# Patient Record
Sex: FEMALE | Race: WHITE | NOT HISPANIC OR LATINO | Employment: OTHER | URBAN - METROPOLITAN AREA
[De-identification: names, ages, dates, MRNs, and addresses within clinical notes are randomized per-mention and may not be internally consistent; named-entity substitution may affect disease eponyms.]

---

## 2017-01-06 ENCOUNTER — ALLIED HEALTH/NURSE VISIT (OUTPATIENT)
Dept: FAMILY MEDICINE | Facility: CLINIC | Age: 57
End: 2017-01-06
Payer: COMMERCIAL

## 2017-01-06 VITALS — DIASTOLIC BLOOD PRESSURE: 72 MMHG | SYSTOLIC BLOOD PRESSURE: 116 MMHG | HEART RATE: 76 BPM

## 2017-01-06 DIAGNOSIS — I10 ESSENTIAL HYPERTENSION WITH GOAL BLOOD PRESSURE LESS THAN 140/90: ICD-10-CM

## 2017-01-06 PROCEDURE — 99207 ZZC NO CHARGE NURSE ONLY: CPT | Performed by: FAMILY MEDICINE

## 2017-01-06 NOTE — PROGRESS NOTES
Usha Green is enrolled/participating in the retail pharmacy Blood Pressure Goals Achievement Program (BPGAP).  Usha Green was evaluated at [facility] on January 6, 2017 at which time her blood pressure was:    BP Readings from Last 3 Encounters:   01/06/17 116/72   12/23/16 116/75   09/23/16 109/66     Reviewed lifestyle modifications for blood pressure control and reduction: including making healthy food choices, managing weight, getting regular exercise, smoking cessation, reducing alcohol consumption, monitoring blood pressure regularly.     Usha Green is not experiencing symptoms.    Follow-Up: BP is at goal of < 140/90mmHg (patient 18+ years of age with or without diabetes).  Recommended follow-up at pharmacy in 6 months.     Completed by: Yary Posada PharmD  [facility]

## 2017-02-03 DIAGNOSIS — E78.5 HYPERLIPIDEMIA LDL GOAL <130: ICD-10-CM

## 2017-02-03 LAB
CHOLEST SERPL-MCNC: 287 MG/DL
HDLC SERPL-MCNC: 44 MG/DL
LDLC SERPL CALC-MCNC: 228 MG/DL
NONHDLC SERPL-MCNC: 243 MG/DL
TRIGL SERPL-MCNC: 77 MG/DL

## 2017-02-03 PROCEDURE — 36415 COLL VENOUS BLD VENIPUNCTURE: CPT | Performed by: FAMILY MEDICINE

## 2017-02-03 PROCEDURE — 80061 LIPID PANEL: CPT | Performed by: FAMILY MEDICINE

## 2018-02-25 ENCOUNTER — HEALTH MAINTENANCE LETTER (OUTPATIENT)
Age: 58
End: 2018-02-25

## 2019-09-20 ENCOUNTER — TRANSFERRED RECORDS (OUTPATIENT)
Dept: HEALTH INFORMATION MANAGEMENT | Facility: CLINIC | Age: 59
End: 2019-09-20

## 2020-02-23 ENCOUNTER — HEALTH MAINTENANCE LETTER (OUTPATIENT)
Age: 60
End: 2020-02-23

## 2020-08-25 ENCOUNTER — TRANSFERRED RECORDS (OUTPATIENT)
Dept: HEALTH INFORMATION MANAGEMENT | Facility: CLINIC | Age: 60
End: 2020-08-25

## 2020-09-10 ENCOUNTER — TRANSFERRED RECORDS (OUTPATIENT)
Dept: HEALTH INFORMATION MANAGEMENT | Facility: CLINIC | Age: 60
End: 2020-09-10

## 2020-10-27 ENCOUNTER — TRANSFERRED RECORDS (OUTPATIENT)
Dept: HEALTH INFORMATION MANAGEMENT | Facility: CLINIC | Age: 60
End: 2020-10-27

## 2020-12-06 ENCOUNTER — HEALTH MAINTENANCE LETTER (OUTPATIENT)
Age: 60
End: 2020-12-06

## 2021-04-11 ENCOUNTER — HEALTH MAINTENANCE LETTER (OUTPATIENT)
Age: 61
End: 2021-04-11

## 2021-04-12 ENCOUNTER — IMMUNIZATION (OUTPATIENT)
Dept: NURSING | Facility: CLINIC | Age: 61
End: 2021-04-12
Payer: COMMERCIAL

## 2021-04-12 PROCEDURE — 91300 PR COVID VAC PFIZER DIL RECON 30 MCG/0.3 ML IM: CPT

## 2021-04-12 PROCEDURE — 0001A PR COVID VAC PFIZER DIL RECON 30 MCG/0.3 ML IM: CPT

## 2021-05-03 ENCOUNTER — IMMUNIZATION (OUTPATIENT)
Dept: NURSING | Facility: CLINIC | Age: 61
End: 2021-05-03
Attending: INTERNAL MEDICINE
Payer: COMMERCIAL

## 2021-05-03 PROCEDURE — 0002A PR COVID VAC PFIZER DIL RECON 30 MCG/0.3 ML IM: CPT

## 2021-05-03 PROCEDURE — 91300 PR COVID VAC PFIZER DIL RECON 30 MCG/0.3 ML IM: CPT

## 2021-06-08 ENCOUNTER — OFFICE VISIT - HEALTHEAST (OUTPATIENT)
Dept: FAMILY MEDICINE | Facility: CLINIC | Age: 61
End: 2021-06-08

## 2021-06-08 DIAGNOSIS — M70.62 TROCHANTERIC BURSITIS OF LEFT HIP: ICD-10-CM

## 2021-06-08 DIAGNOSIS — I10 ESSENTIAL HYPERTENSION WITH GOAL BLOOD PRESSURE LESS THAN 140/90: ICD-10-CM

## 2021-06-08 DIAGNOSIS — Z00.00 ROUTINE GENERAL MEDICAL EXAMINATION AT A HEALTH CARE FACILITY: ICD-10-CM

## 2021-06-08 DIAGNOSIS — Z12.4 CERVICAL CANCER SCREENING: ICD-10-CM

## 2021-06-08 DIAGNOSIS — Z13.1 DIABETES MELLITUS SCREENING: ICD-10-CM

## 2021-06-08 DIAGNOSIS — Z12.31 VISIT FOR SCREENING MAMMOGRAM: ICD-10-CM

## 2021-06-08 DIAGNOSIS — E66.3 OVERWEIGHT (BMI 25.0-29.9): ICD-10-CM

## 2021-06-08 DIAGNOSIS — N18.31 STAGE 3A CHRONIC KIDNEY DISEASE (H): ICD-10-CM

## 2021-06-08 DIAGNOSIS — Z76.89 ENCOUNTER TO ESTABLISH CARE WITH NEW DOCTOR: ICD-10-CM

## 2021-06-08 DIAGNOSIS — E78.5 HYPERLIPIDEMIA LDL GOAL <130: ICD-10-CM

## 2021-06-08 LAB
ANION GAP SERPL CALCULATED.3IONS-SCNC: 11 MMOL/L (ref 5–18)
BUN SERPL-MCNC: 13 MG/DL (ref 8–22)
CALCIUM SERPL-MCNC: 9.2 MG/DL (ref 8.5–10.5)
CHLORIDE BLD-SCNC: 106 MMOL/L (ref 98–107)
CHOLEST SERPL-MCNC: 331 MG/DL
CO2 SERPL-SCNC: 22 MMOL/L (ref 22–31)
CREAT SERPL-MCNC: 0.88 MG/DL (ref 0.6–1.1)
FASTING STATUS PATIENT QL REPORTED: YES
GFR SERPL CREATININE-BSD FRML MDRD: >60 ML/MIN/1.73M2
GLUCOSE BLD-MCNC: 90 MG/DL (ref 70–125)
HBA1C MFR BLD: 5.3 %
HDLC SERPL-MCNC: 42 MG/DL
LDLC SERPL CALC-MCNC: 255 MG/DL
POTASSIUM BLD-SCNC: 4.2 MMOL/L (ref 3.5–5)
SODIUM SERPL-SCNC: 139 MMOL/L (ref 136–145)
TRIGL SERPL-MCNC: 171 MG/DL

## 2021-06-08 ASSESSMENT — MIFFLIN-ST. JEOR: SCORE: 1327.99

## 2021-06-09 ENCOUNTER — AMBULATORY - HEALTHEAST (OUTPATIENT)
Dept: FAMILY MEDICINE | Facility: CLINIC | Age: 61
End: 2021-06-09

## 2021-06-09 ENCOUNTER — COMMUNICATION - HEALTHEAST (OUTPATIENT)
Dept: FAMILY MEDICINE | Facility: CLINIC | Age: 61
End: 2021-06-09

## 2021-06-09 DIAGNOSIS — E78.2 MIXED HYPERLIPIDEMIA: ICD-10-CM

## 2021-06-09 LAB
HPV SOURCE: NORMAL
HUMAN PAPILLOMA VIRUS 16 DNA: NEGATIVE
HUMAN PAPILLOMA VIRUS 18 DNA: NEGATIVE
HUMAN PAPILLOMA VIRUS FINAL DIAGNOSIS: NORMAL
HUMAN PAPILLOMA VIRUS OTHER HR: NEGATIVE
SPECIMEN DESCRIPTION: NORMAL

## 2021-06-11 ENCOUNTER — RECORDS - HEALTHEAST (OUTPATIENT)
Dept: ADMINISTRATIVE | Facility: OTHER | Age: 61
End: 2021-06-11

## 2021-06-14 ENCOUNTER — HOSPITAL ENCOUNTER (OUTPATIENT)
Dept: MAMMOGRAPHY | Facility: CLINIC | Age: 61
Discharge: HOME | End: 2021-06-14
Attending: FAMILY MEDICINE
Payer: COMMERCIAL

## 2021-06-14 DIAGNOSIS — Z12.31 VISIT FOR SCREENING MAMMOGRAM: ICD-10-CM

## 2021-06-15 ENCOUNTER — RECORDS - HEALTHEAST (OUTPATIENT)
Dept: RADIOLOGY | Facility: CLINIC | Age: 61
End: 2021-06-15

## 2021-06-15 ENCOUNTER — COMMUNICATION - HEALTHEAST (OUTPATIENT)
Dept: FAMILY MEDICINE | Facility: CLINIC | Age: 61
End: 2021-06-15

## 2021-06-15 LAB
BKR LAB AP ABNORMAL BLEEDING: NO
BKR LAB AP BIRTH CONTROL/HORMONES: NORMAL
BKR LAB AP CERVICAL APPEARANCE: NORMAL
BKR LAB AP GYN ADEQUACY: NORMAL
BKR LAB AP GYN INTERPRETATION: NORMAL
BKR LAB AP HPV REFLEX: NORMAL
BKR LAB AP LMP: 2012
BKR LAB AP PATIENT STATUS: NORMAL
BKR LAB AP PREVIOUS ABNORMAL: NORMAL
BKR LAB AP PREVIOUS NORMAL: 2014
HIGH RISK?: NO
PATH REPORT.COMMENTS IMP SPEC: NORMAL
RESULT FLAG (HE HISTORICAL CONVERSION): NORMAL

## 2021-06-25 NOTE — TELEPHONE ENCOUNTER
----- Message from Oneyda Awan MD sent at 6/9/2021  2:43 PM CDT -----  Please call patient with test results.    Hi Maria Elena,    Your blood test results have returned.  Your electrolytes and kidney function are normal.  Your cholesterol levels are significantly elevated -Dr. Awan recommends that you start cholesterol medication, she has sent one to your pharmacy that you will likely tolerate better than the atorvastatin.  You tested negative for diabetes.    Thanks,  Dr. Awan

## 2021-06-25 NOTE — TELEPHONE ENCOUNTER
Reason contacted:  Test results within this encounter   Information relayed:  Dr Awan's notes below   Additional questions:  No  Further follow-up needed:  No  Okay to leave a detailed message:  No

## 2021-06-26 NOTE — PROGRESS NOTES
Assessment/Plan:   Maria Elena is a 61 year old female here for physical.    1. Encounter to establish care with new doctor  Establishing care today, all past medical history reviewed and updated in EMR.  Some records visible in Care Everywhere.    2. Routine general medical examination at a health care facility  Physical completed today.  Labs as below.  Vaccine as below.  Up-to-date with colon cancer screening, colonoscopy in 2012 was normal, recommended follow-up in 10 years.  Due for mammogram which was ordered; Pap smear completed today.  - Td, Preservative Free (green label)    3. Cervical cancer screening  Due for Pap smear, cotesting completed today.  - Gynecologic Cytology (PAP Smear)    4. Visit for screening mammogram  Due for mammogram, ordered today.  - Mammo Screening Bilateral; Future    5. Hyperlipidemia LDL goal <130  History hyperlipidemia, previously on statin but was able to stop this after weight loss improved cholesterol levels, per care everywhere levels had increased in 2017 which patient reports being unaware of, will recheck today.  - Lipid [place] FASTING    6. Essential hypertension with goal blood pressure less than 140/90  History hypertension, previously on medication but was able to stop after weight loss, blood pressure normal today, will continue to monitor but will not restart medicine at this time.  - Basic Metabolic Panel    7. Stage 3 chronic kidney disease, stage 3a  History CKD 3, will recheck BMP today.  - Basic Metabolic Panel    8. Diabetes mellitus screening  Given age, will screen for diabetes with A1c.  - Glycosylated Hemoglobin A1c    9. Trochanteric bursitis of left hip  Left hip pain consistent with trochanteric bursitis, recommend physical therapy and referral placed today, also discussed Tylenol/ibuprofen, ice/heat.  - Ambulatory referral to Adult PT- Internal      I have had an Advance Directives discussion with the patient.  The following high BMI interventions were  performed this visit: encouragement to exercise and lifestyle education regarding diet    Follow up: 1 year for physical, sooner as needed pending test results    Oneyda Awan MD  [facility]    Subjective:     Usha Green is a 61 y.o. female who presents for an annual exam.     Hip pain  -L hip  -has been bothering her for a long time - felt better after ankle surgery - has been worsening  -can't walk more than 2-2.5 miles w/o pain  -hurts if lays on that side  -stiff if sitting for a long time  -no significant exercise changes    Previously on statin and HTN meds - didn't need after losing weight but now gained some weight again    Healthy Habits:   Healthy Diet: working on it  Regular Exercise: walks, hiking, weight training  Sunscreen Use: Yes  Dental Visits Regularly: Yes  Seat Belt: Yes  Domestic abuse:   No    Health Maintenance reviewed:  Lipid Profile: due  Glucose Screen: due  Colonoscopy: 2012 nml and repeat 10 yrs  Mammogram: due, last 2015, hx lump L breast fibroadenoma (1998)    Gynecologic History  No LMP recorded. Patient is postmenopausal.  Contraception: post menopausal status  Pap hx: 2011 nml neg HPV, 2014 nml  Abnormal around 2008/2009 - no colp just retested and back to normal    Immunization History   Administered Date(s) Administered     COVID-19,PF,Pfizer 04/12/2021, 05/03/2021     INFLUENZA,SEASONAL QUAD, PF, =/> 6months 12/18/2013, 12/21/2015, 10/30/2020     Influenza, Seasonal, Inj PF IIV3 11/06/2006, 09/22/2009, 11/12/2012     Influenza,seasonal, Inj IIV3 11/21/2003, 11/17/2005, 09/22/2009, 11/12/2012, 10/29/2014     Pneumo Conj 13-V (2010&after) 12/21/2015     Td, adult adsorbed, PF 06/08/2021     Tdap 08/19/2008     Immunization status: up to date and documented.    Current Outpatient Medications   Medication Sig Dispense Refill     diphenhydrAMINE (BENADRYL) 25 mg tablet Take 25 mg by mouth at bedtime as needed for sleep.       FLAXSEED OIL MISC Use 1,400 mg As Directed.        calcium carbonate-vitamin D3 (CALCIUM 600 WITH VITAMIN D3) 600 mg(1,500mg) -400 unit Chew Chew.       MULTI-VITAMIN HI-PO ORAL Take by mouth.       No current facility-administered medications for this visit.      History reviewed. No pertinent past medical history.  Past Surgical History:   Procedure Laterality Date     ANKLE FRACTURE SURGERY Right 2020     APPENDECTOMY  1988     CARPAL TUNNEL RELEASE Right 1988     Atorvastatin  Family History   Problem Relation Age of Onset     Diabetes Mother      Hypertension Mother      Hyperlipidemia Mother      Macular degeneration Mother      Hypertension Father      Glaucoma Father      Atrial fibrillation Father      Hypertension Brother      Heart attack Maternal Grandmother      No Medical Problems Maternal Grandfather      No Medical Problems Paternal Grandmother      Diabetes Paternal Grandfather      Colon cancer Paternal Grandfather         possibly     Cataracts Brother      Heart disease Brother      Social History     Socioeconomic History     Marital status:      Spouse name: Not on file     Number of children: Not on file     Years of education: Not on file     Highest education level: Not on file   Occupational History     Not on file   Social Needs     Financial resource strain: Not on file     Food insecurity     Worry: Not on file     Inability: Not on file     Transportation needs     Medical: Not on file     Non-medical: Not on file   Tobacco Use     Smoking status: Never Smoker     Smokeless tobacco: Never Used     Tobacco comment: casual smoking in college   Substance and Sexual Activity     Alcohol use: Yes     Frequency: 2-4 times a month     Drinks per session: 1 or 2     Binge frequency: Never     Drug use: Never     Sexual activity: Not on file   Lifestyle     Physical activity     Days per week: Not on file     Minutes per session: Not on file     Stress: Not on file   Relationships     Social connections     Talks on phone: Not on  "file     Gets together: Not on file     Attends Taoist service: Not on file     Active member of club or organization: Not on file     Attends meetings of clubs or organizations: Not on file     Relationship status: Not on file     Intimate partner violence     Fear of current or ex partner: Not on file     Emotionally abused: Not on file     Physically abused: Not on file     Forced sexual activity: Not on file   Other Topics Concern     Not on file   Social History Narrative     Not on file       Review of Systems negative unless noted above    Objective:        Vitals:    06/08/21 0818   BP: 120/80   Pulse: 72   Weight: 162 lb 5 oz (73.6 kg)   Height: 5' 6\" (1.676 m)     Body mass index is 26.2 kg/m .    Physical Exam:  General Appearance: Alert, pleasant, appears stated age, overweight  Head: Normocephalic, without obvious abnormality, wearing glasses  Eyes: PERRL, conjunctiva/corneas clear, EOM's intact  Ears: Normal TM's and external ear canals, both ears  Neck: Supple,without lymphadenopathy, no thyromegaly or nodules noted  Lungs: Clear to auscultation bilaterally, respirations unlabored, no wheezing or crackles  Heart: Regular rate and rhythm, no murmur   Abdomen: Soft, non-tender, no masses, no organomegaly  Extremities: Extremities with strong and symmetric pulses, no cyanosis or edema; lateral hip pain with palpation of bursa  Skin: Skin color, texture normal, no rashes or lesions  Neurologic: Grossly normal, no focal deficits  Pelvic exam: Normal external female genitalia, normal-appearing cervix, no discharge    "

## 2021-07-04 NOTE — LETTER
Letter by Isabel Byrd RN at      Author: Isabel Byrd RN Service: -- Author Type: --    Filed:  Encounter Date: 6/15/2021 Status: (Other)         Usha Green  [address] [place] [address]             Rekha 15, 2021         Dear Ms. Green,    We are happy to inform you that your recent Pap smear and Human Papillomavirus (HPV) test results are normal and negative.    It is recommended that you have your next Pap smear and Human Papillomavirus (HPV) test in 5 years. You will also need to return to the clinic every year for an annual wellness visit.    If you have additional questions regarding this result, please contact our office and we will be happy to assist you.      Sincerely,    Your [facility] Care Team

## 2021-07-06 VITALS
SYSTOLIC BLOOD PRESSURE: 120 MMHG | WEIGHT: 162.31 LBS | BODY MASS INDEX: 26.08 KG/M2 | HEIGHT: 66 IN | DIASTOLIC BLOOD PRESSURE: 80 MMHG | HEART RATE: 72 BPM

## 2021-09-26 ENCOUNTER — HEALTH MAINTENANCE LETTER (OUTPATIENT)
Age: 61
End: 2021-09-26

## 2022-01-20 ENCOUNTER — OFFICE VISIT (OUTPATIENT)
Dept: FAMILY MEDICINE | Facility: CLINIC | Age: 62
End: 2022-01-20
Payer: COMMERCIAL

## 2022-01-20 VITALS
DIASTOLIC BLOOD PRESSURE: 78 MMHG | BODY MASS INDEX: 28.25 KG/M2 | SYSTOLIC BLOOD PRESSURE: 124 MMHG | HEART RATE: 99 BPM | WEIGHT: 175 LBS | OXYGEN SATURATION: 99 %

## 2022-01-20 DIAGNOSIS — S90.121A CONTUSION OF LESSER TOE OF RIGHT FOOT WITHOUT DAMAGE TO NAIL, INITIAL ENCOUNTER: Primary | ICD-10-CM

## 2022-01-20 PROCEDURE — 99213 OFFICE O/P EST LOW 20 MIN: CPT | Performed by: FAMILY MEDICINE

## 2022-01-20 RX ORDER — PRAVASTATIN SODIUM 40 MG
TABLET ORAL
COMMUNITY
Start: 2021-11-28 | End: 2022-03-31

## 2022-01-20 NOTE — PATIENT INSTRUCTIONS
I recommend ice an topical antibiotic ointment    Buddy tape to adjacent toe    Wear supportive footwear    Ibuprofen or tylenol as needed    Watch for increasing redness, swelling, discharge

## 2022-01-23 NOTE — PROGRESS NOTES
"  Assessment & Plan     Contusion of lesser toe of right foot without damage to nail, initial encounter  We discussed treatment options.  Did offer to perform x-ray to assess for presence of fracture.  Ultimately decided that this would not  so patient declined.  At this time, discussed that the redness and swelling is most likely secondary to reinjury rather than infection.  Discussed applying antibiotic ointment as a precaution.  Continue to monitor for increasing redness, swelling, pain or purulent discharge.  Recommend icing and NSAIDs.  Recommend supportive footwear.  Also recommend petrona taping injured toe to the adjacent toe for added support.  Follow-up if symptoms not improving with this treatment or if new concerning symptoms develop.               BMI:   Estimated body mass index is 28.25 kg/m  as calculated from the following:    Height as of 6/8/21: 1.676 m (5' 6\").    Weight as of this encounter: 79.4 kg (175 lb).           No follow-ups on file.    Ela Suárez MD  [facility]BALAJI Kerr is a 61 year old who presents for the following health issues     HPI   She is seen today for concern of injury of her right third toe.  She stubbed it about 3 weeks ago.  It was swollen and tender to the touch.  She treated by petrona taping the toe to the adjacent toe.  It was getting better but then she stubbed her toe again.  It is now red, swollen and painful again.  She was concerned about possible infection.  She is otherwise feeling well.  She is able to walk.  She has no other concerns or questions today.        Review of Systems         Objective    /78 (BP Location: Left arm, Cuff Size: Adult Regular)   Pulse 99   Wt 79.4 kg (175 lb)   LMP 06/18/2009   SpO2 99%   BMI 28.25 kg/m    Body mass index is 28.25 kg/m .  Physical Exam   GENERAL: healthy, alert and no distress  Ext: There is erythema and edema around of the distal right third toe near " the proximal nail fold.  There is tenderness with palpation.  No purulent discharge.  She is able to flex and extend the toe.  No other significant deformity.

## 2022-03-31 DIAGNOSIS — E78.5 HYPERLIPIDEMIA LDL GOAL <130: Primary | ICD-10-CM

## 2022-03-31 RX ORDER — PRAVASTATIN SODIUM 40 MG
40 TABLET ORAL
COMMUNITY
Start: 2021-06-09 | End: 2022-07-07

## 2022-03-31 RX ORDER — PRAVASTATIN SODIUM 40 MG
40 TABLET ORAL DAILY
Qty: 90 TABLET | Refills: 0 | Status: SHIPPED | OUTPATIENT
Start: 2022-03-31 | End: 2022-07-05

## 2022-03-31 NOTE — TELEPHONE ENCOUNTER
Patient is traveling the end of May and will be running out of the pravastatin (PRAVACHOL) 40 MG tablets.    She states that her annual physical is later this year, 7/6/2022 due to travel.     Please advise.    Pending Prescriptions:                       Disp   Refills    pravastatin (PRAVACHOL) 40 MG tablet      90 tab*0            Sig: Take 1 tablet (40 mg) by mouth daily Do not fill           until patient requests. Patient does not need Rx           today.    Signed Prescriptions:                        Disp   Refills    pravastatin (PRAVACHOL) 40 MG tablet                       Sig: Take 40 mg by mouth  Authorizing Provider: PATIENT REPORTED  Ordering User: JANNET OLSEN

## 2022-07-04 ASSESSMENT — ENCOUNTER SYMPTOMS
CONSTIPATION: 0
ARTHRALGIAS: 1
NERVOUS/ANXIOUS: 0
HEMATOCHEZIA: 0
FREQUENCY: 0
DIZZINESS: 0
HEARTBURN: 0
HEADACHES: 0
CHILLS: 0
NAUSEA: 0
DYSURIA: 0
COUGH: 0
JOINT SWELLING: 0
EYE PAIN: 0
SHORTNESS OF BREATH: 0
FEVER: 0
DIARRHEA: 0
WEAKNESS: 0
BREAST MASS: 0
HEMATURIA: 0
PARESTHESIAS: 0
SORE THROAT: 0
MYALGIAS: 0
PALPITATIONS: 0
ABDOMINAL PAIN: 0

## 2022-07-05 NOTE — PROGRESS NOTES
Assessment/Plan:   Maria Elena is a 62 year old female here for physical with additional concerns    Routine adult health maintenance  Physical completed today.  Vaccine as below.  Labs as below.  Up-to-date with Pap smear.  Due for colonoscopy, ordered today.  Mammogram scheduled for next week.  - COVID-19,PF,PFIZER (12+ Yrs GRAY LABEL)    History of hyperlipidemia  History of hyperlipidemia, currently taking pravastatin, will recheck levels today.  - Lipid panel reflex to direct LDL Fasting    Diabetes mellitus screening  Given age, will screen for diabetes today with A1c.  - Hemoglobin A1c    Stage 3a chronic kidney disease (H)  History of CKD, will check labs as below.  - Albumin Random Urine Quantitative with Creat Ratio  - BASIC METABOLIC PANEL  - CBC with platelets  - UA Macro with Reflex to Micro and Culture - lab collect    Hx of essential hypertension  History of hypertension, currently normotensive, will will check labs as below.  - BASIC METABOLIC PANEL  - UA Macro with Reflex to Micro and Culture - lab collect    Screening for HIV (human immunodeficiency virus)  Due for one-time HIV screen.  - HIV Antigen Antibody Combo    Screen for colon cancer  Due for colon cancer screening, order placed today.  - Colonscopy Screening  Referral    Chronic right shoulder pain  Patient reports issues with right shoulder since last fall, suspect tendinitis/rotator cuff issue, referral to physical therapy placed today.  - Physical Therapy Referral    Dermatitis  Based on location suspect fungal infection of the breast, will treat with clotrimazole.  Patient will send a MyChart message in 2 weeks if not resolved.  Patient does have screening mammogram next week.  - clotrimazole (LOTRIMIN) 1 % external cream  Dispense: 45 g; Refill: 1       I have had an Advance Directives discussion with the patient.    Follow up: 1 year for physical, sooner as needed    Oneyda Awan MD  [facility]      Subjective:      Usha Green is a 62 year old female who presents for an annual exam.     Answers for HPI/ROS submitted by the patient on 7/4/2022  Frequency of exercise:: 2-3 days/week  Getting at least 3 servings of Calcium per day:: Yes  Diet:: Regular (no restrictions)  Taking medications regularly:: Yes  Medication side effects:: None  Bi-annual eye exam:: Yes  Dental care twice a year:: Yes  Sleep apnea or symptoms of sleep apnea:: None  abdominal pain: No  Blood in stool: No  Blood in urine: No  chest pain: No  chills: No  congestion: Yes  constipation: No  cough: No  diarrhea: No  dizziness: No  ear pain: No  eye pain: No  nervous/anxious: No  fever: No  frequency: No  genital sores: No  headaches: No  hearing loss: No  heartburn: No  arthralgias: Yes  joint swelling: No  peripheral edema: No  mood changes: No  myalgias: No  nausea: No  dysuria: No  palpitations: No  Skin sensation changes: No  sore throat: No  urgency: No  rash: Yes  shortness of breath: No  visual disturbance: No  weakness: No  pelvic pain: No  vaginal bleeding: No  vaginal discharge: No  tenderness: No  breast mass: No  breast discharge: No  Additional concerns today:: Yes  Duration of exercise:: 30-45 minutes    Rash under L breast - can't really see it right now but it is itchy - started with what looked like pimples and colorless bumps - started a month ago - tried anti-itch cream - no obvious cause    R shoulder - in Nov was doing repetitive motions and shoulder was sore, seemed to be improving - April slipped and fell on shoulder, lots of pain initially - trying to do some exercises and has improved a bit but still kind of nagging pain - abduction hurts the most - R handed - occasional ice/heat but it has been a while, occasional tylenol/ibuprofen    Health Maintenance reviewed:  Lipid Profile: due  Glucose Screen: due  Colonoscopy: due  Mammogram: scheduled next week    Gynecologic History  Patient's last menstrual period was  06/18/2009.  Contraception: post menopausal status  Last Pap: 2021. Results were: nml with neg HPV    Immunization History   Administered Date(s) Administered     COVID-19,PF,Moderna 12/28/2021     COVID-19,PF,Pfizer (12+ Yrs) 04/12/2021, 05/03/2021     FLU 6-35 months 11/06/2006, 09/22/2009, 11/12/2012     Influenza (IIV3) PF 11/21/2003, 11/17/2005, 09/22/2009, 11/12/2012, 10/29/2014     Influenza Vaccine IM > 6 months Valent IIV4 (Alfuria,Fluzone) 12/18/2013, 12/21/2015, 10/30/2020, 10/23/2021     Pneumo Conj 13-V (2010&after) 12/21/2015     TD (ADULT, 7+) 06/08/2021     TDAP Vaccine (Adacel) 08/19/2008     Immunization status: covid booster today.    Current Outpatient Medications   Medication Sig Dispense Refill     calcium carbonate-vitamin D 600-400 MG-UNIT CHEW        clotrimazole (LOTRIMIN) 1 % external cream Apply topically 2 times daily 45 g 1     diphenhydrAMINE (BENADRYL) 25 MG tablet Take 25 mg by mouth       MULTI-VITAMIN PO once daily        pravastatin (PRAVACHOL) 40 MG tablet Take 40 mg by mouth       Past Medical History:   Diagnosis Date     Abnormal Pap smear of cervix 2005    ascus, neg HPV, nl since     AR (allergic rhinitis)      Arthritis      Hypercholesterolemia      Hypertension 12/16/2013     Migraines      Past Surgical History:   Procedure Laterality Date     ANKLE FRACTURE SURGERY Right 2020     APPENDECTOMY       APPENDECTOMY  1988     BIOPSY  1994    unsure of date     BIOPSY BREAST Left 2001    non cancerous  fibroidoma     CARPAL TUNNEL RELEASE RT/LT  1988     COLONOSCOPY  2012     RELEASE CARPAL TUNNEL Right 1988     Atorvastatin  Family History   Problem Relation Age of Onset     Diabetes Mother      Hypertension Mother      Hypertension Father      Lipids Mother      Osteoporosis Mother      Breast Cancer Maternal Aunt         postmenopausal     Cardiovascular Mother         chf     Alzheimer Disease Mother         passed 2014     Hyperlipidemia Mother      Breast Cancer Other          Maternal aunt     Cerebrovascular Disease Mother      Macular Degeneration Mother      Glaucoma Father      Atrial fibrillation Father      Hypertension Brother      Coronary Artery Disease Maternal Grandmother      No Known Problems Maternal Grandfather      No Known Problems Paternal Grandmother      Diabetes Paternal Grandfather      Colon Cancer Paternal Grandfather         possibly     Cataracts Brother      Heart Disease Brother      Breast Cancer Maternal Aunt      Social History     Socioeconomic History     Marital status:      Spouse name: Not on file     Number of children: Not on file     Years of education: Not on file     Highest education level: Not on file   Occupational History     Not on file   Tobacco Use     Smoking status: Never Smoker     Smokeless tobacco: Never Used     Tobacco comment: casual smoking in college   Substance and Sexual Activity     Alcohol use: Yes     Comment: 1/week     Drug use: Never     Sexual activity: Yes     Partners: Male     Birth control/protection: Surgical     Comment: vasectomy    Other Topics Concern     Parent/sibling w/ CABG, MI or angioplasty before 65F 55M? No      Service No     Blood Transfusions No     Caffeine Concern No     Occupational Exposure No     Hobby Hazards No     Sleep Concern No     Stress Concern No     Weight Concern No     Special Diet No     Back Care Yes     Comment: chiro in past      Exercise Yes     Bike Helmet No     Comment: n/a     Seat Belt Yes     Self-Exams Yes     Comment: sporadically    Social History Narrative     Not on file     Social Determinants of Health     Financial Resource Strain: Not on file   Food Insecurity: Not on file   Transportation Needs: Not on file   Physical Activity: Not on file   Stress: Not on file   Social Connections: Not on file   Intimate Partner Violence: Not on file   Housing Stability: Not on file       Review of Systems negative unless noted    Objective:        Vitals:     "07/06/22 0938   BP: 120/80   Pulse: 67   Temp: 96.8  F (36  C)   Weight: 77.4 kg (170 lb 9 oz)   Height: 1.676 m (5' 6\")   PainSc: Moderate Pain (4)   PainLoc: Shoulder     Body mass index is 27.53 kg/m .    Physical Exam:  General Appearance: Alert, pleasant, appears stated age  Head: Normocephalic, without obvious abnormality  Eyes: PERRL, conjunctiva/corneas clear, EOM's intact  Ears: Normal TM's and external ear canals, both ears  Neck: Supple,without lymphadenopathy, no thyromegaly or nodules noted  Lungs: Clear to auscultation bilaterally, respirations unlabored, no wheezing or crackles  Heart: Regular rate and rhythm, no murmur   Breast:  Normal appearance.  No palpable masses, nipple discharge, or skin changes  Abdomen: Soft, non-tender, no masses, no organomegaly  Extremities: Extremities with strong and symmetric pulses, no cyanosis or edema - R shoulder pain with adduction, strength decreased compared to L side  Skin: Skin color, texture normal - pt points to area on lower L breast that has been itchy, no obvious rash or skin changes in this area  Neurologic: Grossly normal, no focal deficits    "

## 2022-07-06 ENCOUNTER — OFFICE VISIT (OUTPATIENT)
Dept: FAMILY MEDICINE | Facility: CLINIC | Age: 62
End: 2022-07-06
Payer: COMMERCIAL

## 2022-07-06 VITALS
HEART RATE: 67 BPM | DIASTOLIC BLOOD PRESSURE: 80 MMHG | BODY MASS INDEX: 27.41 KG/M2 | WEIGHT: 170.56 LBS | HEIGHT: 66 IN | TEMPERATURE: 96.8 F | SYSTOLIC BLOOD PRESSURE: 120 MMHG

## 2022-07-06 DIAGNOSIS — Z13.1 DIABETES MELLITUS SCREENING: ICD-10-CM

## 2022-07-06 DIAGNOSIS — G89.29 CHRONIC RIGHT SHOULDER PAIN: ICD-10-CM

## 2022-07-06 DIAGNOSIS — Z00.00 ROUTINE ADULT HEALTH MAINTENANCE: Primary | ICD-10-CM

## 2022-07-06 DIAGNOSIS — Z86.39 HISTORY OF HYPERLIPIDEMIA: ICD-10-CM

## 2022-07-06 DIAGNOSIS — Z11.4 SCREENING FOR HIV (HUMAN IMMUNODEFICIENCY VIRUS): ICD-10-CM

## 2022-07-06 DIAGNOSIS — M25.511 CHRONIC RIGHT SHOULDER PAIN: ICD-10-CM

## 2022-07-06 DIAGNOSIS — N18.31 STAGE 3A CHRONIC KIDNEY DISEASE (H): ICD-10-CM

## 2022-07-06 DIAGNOSIS — Z86.79 HX OF ESSENTIAL HYPERTENSION: ICD-10-CM

## 2022-07-06 DIAGNOSIS — Z12.11 SCREEN FOR COLON CANCER: ICD-10-CM

## 2022-07-06 DIAGNOSIS — L30.9 DERMATITIS: ICD-10-CM

## 2022-07-06 LAB
ALBUMIN UR-MCNC: NEGATIVE MG/DL
ANION GAP SERPL CALCULATED.3IONS-SCNC: 16 MMOL/L (ref 7–15)
APPEARANCE UR: CLEAR
BACTERIA #/AREA URNS HPF: ABNORMAL /HPF
BILIRUB UR QL STRIP: NEGATIVE
BUN SERPL-MCNC: 13.4 MG/DL (ref 8–23)
CALCIUM SERPL-MCNC: 9.8 MG/DL (ref 8.8–10.2)
CHLORIDE SERPL-SCNC: 99 MMOL/L (ref 98–107)
CHOLEST SERPL-MCNC: 289 MG/DL
COLOR UR AUTO: YELLOW
CREAT SERPL-MCNC: 0.88 MG/DL (ref 0.51–0.95)
CREAT UR-MCNC: 19 MG/DL
DEPRECATED HCO3 PLAS-SCNC: 24 MMOL/L (ref 22–29)
ERYTHROCYTE [DISTWIDTH] IN BLOOD BY AUTOMATED COUNT: 11.6 % (ref 10–15)
GFR SERPL CREATININE-BSD FRML MDRD: 75 ML/MIN/1.73M2
GLUCOSE SERPL-MCNC: 85 MG/DL (ref 70–99)
GLUCOSE UR STRIP-MCNC: NEGATIVE MG/DL
HBA1C MFR BLD: 5.2 % (ref 0–5.6)
HCT VFR BLD AUTO: 44.5 % (ref 35–47)
HDLC SERPL-MCNC: 54 MG/DL
HGB BLD-MCNC: 15.1 G/DL (ref 11.7–15.7)
HGB UR QL STRIP: ABNORMAL
KETONES UR STRIP-MCNC: NEGATIVE MG/DL
LDLC SERPL CALC-MCNC: 197 MG/DL
LEUKOCYTE ESTERASE UR QL STRIP: NEGATIVE
MCH RBC QN AUTO: 28.6 PG (ref 26.5–33)
MCHC RBC AUTO-ENTMCNC: 33.9 G/DL (ref 31.5–36.5)
MCV RBC AUTO: 84 FL (ref 78–100)
MICROALBUMIN UR-MCNC: <12 MG/L
MICROALBUMIN/CREAT UR: NORMAL MG/G{CREAT}
NITRATE UR QL: NEGATIVE
NONHDLC SERPL-MCNC: 235 MG/DL
PH UR STRIP: 7 [PH] (ref 5–8)
PLATELET # BLD AUTO: 268 10E3/UL (ref 150–450)
POTASSIUM SERPL-SCNC: 4.2 MMOL/L (ref 3.4–5.3)
RBC # BLD AUTO: 5.28 10E6/UL (ref 3.8–5.2)
RBC #/AREA URNS AUTO: ABNORMAL /HPF
SODIUM SERPL-SCNC: 139 MMOL/L (ref 136–145)
SP GR UR STRIP: 1.01 (ref 1–1.03)
SQUAMOUS #/AREA URNS AUTO: ABNORMAL /LPF
TRIGL SERPL-MCNC: 192 MG/DL
UROBILINOGEN UR STRIP-ACNC: 0.2 E.U./DL
WBC # BLD AUTO: 4.9 10E3/UL (ref 4–11)
WBC #/AREA URNS AUTO: ABNORMAL /HPF

## 2022-07-06 PROCEDURE — 80061 LIPID PANEL: CPT | Performed by: FAMILY MEDICINE

## 2022-07-06 PROCEDURE — 83036 HEMOGLOBIN GLYCOSYLATED A1C: CPT | Performed by: FAMILY MEDICINE

## 2022-07-06 PROCEDURE — 82043 UR ALBUMIN QUANTITATIVE: CPT | Performed by: FAMILY MEDICINE

## 2022-07-06 PROCEDURE — 81001 URINALYSIS AUTO W/SCOPE: CPT | Performed by: FAMILY MEDICINE

## 2022-07-06 PROCEDURE — 80048 BASIC METABOLIC PNL TOTAL CA: CPT | Performed by: FAMILY MEDICINE

## 2022-07-06 PROCEDURE — 99396 PREV VISIT EST AGE 40-64: CPT | Mod: 25 | Performed by: FAMILY MEDICINE

## 2022-07-06 PROCEDURE — 87389 HIV-1 AG W/HIV-1&-2 AB AG IA: CPT | Performed by: FAMILY MEDICINE

## 2022-07-06 PROCEDURE — 85027 COMPLETE CBC AUTOMATED: CPT | Performed by: FAMILY MEDICINE

## 2022-07-06 PROCEDURE — 99213 OFFICE O/P EST LOW 20 MIN: CPT | Mod: 25 | Performed by: FAMILY MEDICINE

## 2022-07-06 PROCEDURE — 0054A COVID-19,PF,PFIZER (12+ YRS): CPT | Performed by: FAMILY MEDICINE

## 2022-07-06 PROCEDURE — 91305 COVID-19,PF,PFIZER (12+ YRS): CPT | Performed by: FAMILY MEDICINE

## 2022-07-06 PROCEDURE — 36415 COLL VENOUS BLD VENIPUNCTURE: CPT | Performed by: FAMILY MEDICINE

## 2022-07-06 RX ORDER — CLOTRIMAZOLE 1 %
CREAM (GRAM) TOPICAL 2 TIMES DAILY
Qty: 45 G | Refills: 1 | Status: SHIPPED | OUTPATIENT
Start: 2022-07-06

## 2022-07-06 ASSESSMENT — PAIN SCALES - GENERAL: PAINLEVEL: MODERATE PAIN (4)

## 2022-07-07 LAB — HIV 1+2 AB+HIV1 P24 AG SERPL QL IA: NONREACTIVE

## 2022-07-07 RX ORDER — PRAVASTATIN SODIUM 80 MG/1
80 TABLET ORAL DAILY
Qty: 90 TABLET | Refills: 3 | Status: SHIPPED | OUTPATIENT
Start: 2022-07-07 | End: 2023-07-03

## 2022-07-15 ENCOUNTER — TELEPHONE (OUTPATIENT)
Dept: FAMILY MEDICINE | Facility: CLINIC | Age: 62
End: 2022-07-15

## 2022-07-15 ENCOUNTER — HOSPITAL ENCOUNTER (OUTPATIENT)
Dept: MAMMOGRAPHY | Facility: CLINIC | Age: 62
Discharge: HOME | End: 2022-07-15
Attending: FAMILY MEDICINE | Admitting: FAMILY MEDICINE
Payer: COMMERCIAL

## 2022-07-15 DIAGNOSIS — Z12.31 VISIT FOR SCREENING MAMMOGRAM: ICD-10-CM

## 2022-07-15 PROCEDURE — 77067 SCR MAMMO BI INCL CAD: CPT

## 2022-07-15 NOTE — TELEPHONE ENCOUNTER
Reason for Call: Request for an order or referral:    Order or referral being requested: PT right shoulder    Date needed: as soon as possible    Has the patient been seen by the PCP for this problem? YES    Additional comments: [facility] calling to get order for patient. Her appt is Monday. Fax to [phone]    Phone number Patient can be reached at:  Other phone number:  Flores from [facility] [phone]    Best Time:  any    Can we leave a detailed message on this number?  NO    Call taken on 7/15/2022 at 3:23 PM by Roxana aVlerio

## 2022-09-27 ENCOUNTER — TRANSFERRED RECORDS (OUTPATIENT)
Dept: HEALTH INFORMATION MANAGEMENT | Facility: CLINIC | Age: 62
End: 2022-09-27

## 2022-10-11 ENCOUNTER — TRANSFERRED RECORDS (OUTPATIENT)
Dept: HEALTH INFORMATION MANAGEMENT | Facility: CLINIC | Age: 62
End: 2022-10-11

## 2022-12-13 ENCOUNTER — OFFICE VISIT (OUTPATIENT)
Dept: FAMILY MEDICINE | Facility: CLINIC | Age: 62
End: 2022-12-13
Payer: COMMERCIAL

## 2022-12-13 VITALS
RESPIRATION RATE: 16 BRPM | DIASTOLIC BLOOD PRESSURE: 80 MMHG | TEMPERATURE: 98.2 F | SYSTOLIC BLOOD PRESSURE: 130 MMHG | HEIGHT: 66 IN | HEART RATE: 77 BPM | OXYGEN SATURATION: 99 % | WEIGHT: 180.4 LBS | BODY MASS INDEX: 28.99 KG/M2

## 2022-12-13 DIAGNOSIS — G89.29 CHRONIC RIGHT SHOULDER PAIN: ICD-10-CM

## 2022-12-13 DIAGNOSIS — M25.511 CHRONIC RIGHT SHOULDER PAIN: ICD-10-CM

## 2022-12-13 DIAGNOSIS — N18.30 STAGE 3 CHRONIC KIDNEY DISEASE, UNSPECIFIED WHETHER STAGE 3A OR 3B CKD (H): ICD-10-CM

## 2022-12-13 DIAGNOSIS — Z01.818 PREOP EXAMINATION: Primary | ICD-10-CM

## 2022-12-13 LAB
ERYTHROCYTE [DISTWIDTH] IN BLOOD BY AUTOMATED COUNT: 12.1 % (ref 10–15)
HCT VFR BLD AUTO: 43.1 % (ref 35–47)
HGB BLD-MCNC: 14.8 G/DL (ref 11.7–15.7)
MCH RBC QN AUTO: 28.8 PG (ref 26.5–33)
MCHC RBC AUTO-ENTMCNC: 34.3 G/DL (ref 31.5–36.5)
MCV RBC AUTO: 84 FL (ref 78–100)
PLATELET # BLD AUTO: 259 10E3/UL (ref 150–450)
RBC # BLD AUTO: 5.13 10E6/UL (ref 3.8–5.2)
WBC # BLD AUTO: 5.1 10E3/UL (ref 4–11)

## 2022-12-13 PROCEDURE — U0003 INFECTIOUS AGENT DETECTION BY NUCLEIC ACID (DNA OR RNA); SEVERE ACUTE RESPIRATORY SYNDROME CORONAVIRUS 2 (SARS-COV-2) (CORONAVIRUS DISEASE [COVID-19]), AMPLIFIED PROBE TECHNIQUE, MAKING USE OF HIGH THROUGHPUT TECHNOLOGIES AS DESCRIBED BY CMS-2020-01-R: HCPCS | Performed by: FAMILY MEDICINE

## 2022-12-13 PROCEDURE — 99214 OFFICE O/P EST MOD 30 MIN: CPT | Performed by: FAMILY MEDICINE

## 2022-12-13 PROCEDURE — 36415 COLL VENOUS BLD VENIPUNCTURE: CPT | Performed by: FAMILY MEDICINE

## 2022-12-13 PROCEDURE — U0005 INFEC AGEN DETEC AMPLI PROBE: HCPCS | Performed by: FAMILY MEDICINE

## 2022-12-13 PROCEDURE — 85027 COMPLETE CBC AUTOMATED: CPT | Performed by: FAMILY MEDICINE

## 2022-12-13 PROCEDURE — 80048 BASIC METABOLIC PNL TOTAL CA: CPT | Performed by: FAMILY MEDICINE

## 2022-12-13 RX ORDER — DIMENHYDRINATE 50 MG
1 TABLET ORAL DAILY
COMMUNITY

## 2022-12-13 ASSESSMENT — PAIN SCALES - GENERAL: PAINLEVEL: NO PAIN (0)

## 2022-12-13 NOTE — PROGRESS NOTES
[facility]  [address] [place] [address] AVE [facility] [address]  [facility] [address]  Phone: [phone]  Fax: [phone]  Primary Provider: Oneyda Awan  Pre-op Performing Provider: ONEYDA AWAN    PREOPERATIVE EVALUATION:  Today's date: 12/13/2022    Usha Green is a 62 year old female who presents for a preoperative evaluation.    Surgical Information:  Surgery/Procedure: rt shoulder arthroscopy and rotator cuff repair, bicep tenodesis, subacromial decompression  Surgery Location: [facility]  Surgeon: Dr Iglesias  Surgery Date: 12-15-22  Time of Surgery: not yet determined  Where patient plans to recover: At home with family  Fax number for surgical facility: [phone]    Type of Anesthesia Anticipated: General with scalene block    Assessment & Plan     The proposed surgical procedure is considered INTERMEDIATE risk.    Preop examination  Chronic right shoulder pain  Preop exam completed today for upcoming shoulder surgery. Labs normal as below; COVID test ordered given sinus symptoms but per discussion this is chronic for pt so suspect test will be negative (but thought would be helpful for surgical team to know for sure) - will call/fax if positive - result negative. Discussed medication plan as below.   - BASIC METABOLIC PANEL  - CBC with platelets  - Asymptomatic COVID-19 Virus (Coronavirus) by PCR    Stage 3 chronic kidney disease, unspecified whether stage 3a or 3b CKD (H)  Hx CKD3 though recent levels normal - will check BMP today.   - BASIC METABOLIC PANEL      Risks and Recommendations:  The patient has the following additional risks and recommendations for perioperative complications:   - No identified additional risk factors other than previously addressed    Medication Instructions:  continue: pravastatin night before  hold: benadryl, calcium - vitamin D, multivitamin, clotrimazole cream  No aspirin or NSAIDs (ibuprofen, motrin, alleve) for 1 week before  surgery    RECOMMENDATION:  APPROVAL GIVEN to proceed with proposed procedure, without further diagnostic evaluation.      Subjective     HPI related to upcoming procedure: went to [facility] for PT, only mildly helpful - did MRI and saw the issues in her shoulder so planning surgery. Persistent pain and decreased ROM    Chronic congestion - no known COVID exposure, no other symptoms    Preop Questions 12/13/2022   1. Have you ever had a heart attack or stroke? No   2. Have you ever had surgery on your heart or blood vessels, such as a stent placement, a coronary artery bypass, or surgery on an artery in your head, neck, heart, or legs? No   3. Do you have chest pain with activity? No   4. Do you have a history of  heart failure? No   5. Do you currently have a cold, bronchitis or symptoms of other infection? No   6. Do you have a cough, shortness of breath, or wheezing? No   7. Do you or anyone in your family have previous history of blood clots? No   8. Do you or does anyone in your family have a serious bleeding problem such as prolonged bleeding following surgeries or cuts? No   9. Have you ever had problems with anemia or been told to take iron pills? No   10. Have you had any abnormal blood loss such as black, tarry or bloody stools, or abnormal vaginal bleeding? No   11. Have you ever had a blood transfusion? No   12. Are you willing to have a blood transfusion if it is medically needed before, during, or after your surgery? Yes   13. Have you or any of your relatives ever had problems with anesthesia? No   14. Do you have sleep apnea, excessive snoring or daytime drowsiness? No   15. Do you have any artifical heart valves or other implanted medical devices like a pacemaker, defibrillator, or continuous glucose monitor? No   16. Do you have artificial joints? No   17. Are you allergic to latex? No   18. Is there any chance that you may be pregnant? No       Health Care Directive:  Patient does not have a Health Care  Directive or Living Will: full code    Preoperative Review of :   reviewed - 2 tablets diazepam in sept 2022 (likely for imaging)    Review of Systems  Constitutional, neuro, ENT, endocrine, pulmonary, cardiac, gastrointestinal, genitourinary, musculoskeletal, integument and psychiatric systems are negative, except as otherwise noted.    Patient Active Problem List    Diagnosis Date Noted     Essential hypertension with goal blood pressure less than 140/90 09/23/2016     Priority: Medium     CKD (chronic kidney disease) stage 3, GFR 30-59 ml/min (H) 12/26/2014     Priority: Medium     Hyperlipidemia LDL goal <130 03/26/2011     Priority: Medium      Past Medical History:   Diagnosis Date     Abnormal Pap smear of cervix 2005    ascus, neg HPV, nl since     AR (allergic rhinitis)      Arthritis      Hypercholesterolemia      Hypertension 12/16/2013     Migraines      Past Surgical History:   Procedure Laterality Date     ANKLE FRACTURE SURGERY Right 2020     APPENDECTOMY       APPENDECTOMY  1988     BIOPSY  1994    unsure of date     BIOPSY BREAST Left 2001    non cancerous  fibroidoma     CARPAL TUNNEL RELEASE RT/LT  1988     COLONOSCOPY  2012     RELEASE CARPAL TUNNEL Right 1988     Current Outpatient Medications   Medication Sig Dispense Refill     calcium carbonate-vitamin D 600-400 MG-UNIT CHEW        clotrimazole (LOTRIMIN) 1 % external cream Apply topically 2 times daily 45 g 1     diphenhydrAMINE (BENADRYL) 25 MG tablet Take 25 mg by mouth       Flaxseed, Linseed, (FLAX SEED OIL) 1000 MG capsule Take 1 capsule by mouth daily       MULTI-VITAMIN PO once daily        pravastatin (PRAVACHOL) 80 MG tablet Take 1 tablet (80 mg) by mouth daily 90 tablet 3       Allergies   Allergen Reactions     Atorvastatin Cramps     Muscle pain        Social History     Tobacco Use     Smoking status: Never     Smokeless tobacco: Never     Tobacco comments:     casual smoking in college   Substance Use Topics     Alcohol  "use: Yes     Comment: 1/week     Family History   Problem Relation Age of Onset     Diabetes Mother      Hypertension Mother      Hypertension Father      Lipids Mother      Osteoporosis Mother      Breast Cancer Maternal Aunt         postmenopausal     Cardiovascular Mother         chf     Alzheimer Disease Mother         passed 2014     Hyperlipidemia Mother      Breast Cancer Other         Maternal aunt     Cerebrovascular Disease Mother      Macular Degeneration Mother      Glaucoma Father      Atrial fibrillation Father      Hypertension Brother      Coronary Artery Disease Maternal Grandmother      No Known Problems Maternal Grandfather      No Known Problems Paternal Grandmother      Diabetes Paternal Grandfather      Colon Cancer Paternal Grandfather         possibly     Cataracts Brother      Heart Disease Brother      Breast Cancer Maternal Aunt      History   Drug Use Unknown         Objective     /80   Pulse 77   Temp 98.2  F (36.8  C) (Oral)   Resp 16   Ht 1.676 m (5' 6\")   Wt 81.8 kg (180 lb 6.4 oz)   LMP 06/18/2009   SpO2 99%   BMI 29.12 kg/m      Physical Exam    GENERAL APPEARANCE: healthy, alert and no distress     EYES: EOMI, PERRL     HENT: ear canals and TM's normal and nose and mouth without ulcers or lesions     NECK: no adenopathy, no asymmetry, masses, or scars and thyroid normal to palpation     RESP: lungs clear to auscultation - no rales, rhonchi or wheezes     CV: regular rates and rhythm, normal S1 S2, no S3 or S4 and no murmur, click or rub     ABDOMEN:  soft, nontender, no HSM or masses and bowel sounds normal     MS: extremities normal- no gross deformities noted, no evidence of inflammation in joints, FROM in all extremities.     SKIN: no suspicious lesions or rashes     NEURO: Normal strength and tone, sensory exam grossly normal, mentation intact and speech normal     PSYCH: mentation appears normal. and affect normal/bright     LYMPHATICS: No cervical " adenopathy    Recent Labs   Lab Test 07/06/22  1013 06/08/21  0900   HGB 15.1  --      --     139   POTASSIUM 4.2 4.2   CR 0.88 0.88   A1C 5.2 5.3        Diagnostics:   12/13/22 13:57   Sodium 142   Potassium 4.6   Chloride 105   Carbon Dioxide (CO2) 21 (L)   Urea Nitrogen 12.6   Creatinine 0.84   GFR Estimate 79   Calcium 9.9   Anion Gap 16 (H)   Glucose 112 (H)      12/13/22 13:57   WBC 5.1   Hemoglobin 14.8   Hematocrit 43.1   Platelet Count 259   RBC Count 5.13   MCV 84   MCH 28.8   MCHC 34.3   RDW 12.1       No EKG needed    Revised Cardiac Risk Index (RCRI):  The patient has the following serious cardiovascular risks for perioperative complications:   - No serious cardiac risks = 0 points     RCRI Interpretation: 0 points: Class I (very low risk - 0.4% complication rate)         Signed Electronically by: Oneyda Awan MD  Copy of this evaluation report is provided to requesting physician.

## 2022-12-13 NOTE — PATIENT INSTRUCTIONS
Medication plan:  continue: pravastatin night before  hold: benadryl, calcium - vitamin D, multivitamin, clotrimazole cream  No aspirin or NSAIDs (ibuprofen, motrin, alleve) for 1 week before surgery

## 2022-12-14 ENCOUNTER — TELEPHONE (OUTPATIENT)
Dept: FAMILY MEDICINE | Facility: CLINIC | Age: 62
End: 2022-12-14

## 2022-12-14 LAB
ANION GAP SERPL CALCULATED.3IONS-SCNC: 16 MMOL/L (ref 7–15)
BUN SERPL-MCNC: 12.6 MG/DL (ref 8–23)
CALCIUM SERPL-MCNC: 9.9 MG/DL (ref 8.8–10.2)
CHLORIDE SERPL-SCNC: 105 MMOL/L (ref 98–107)
CREAT SERPL-MCNC: 0.84 MG/DL (ref 0.51–0.95)
DEPRECATED HCO3 PLAS-SCNC: 21 MMOL/L (ref 22–29)
GFR SERPL CREATININE-BSD FRML MDRD: 79 ML/MIN/1.73M2
GLUCOSE SERPL-MCNC: 112 MG/DL (ref 70–99)
POTASSIUM SERPL-SCNC: 4.6 MMOL/L (ref 3.4–5.3)
SARS-COV-2 RNA RESP QL NAA+PROBE: NEGATIVE
SODIUM SERPL-SCNC: 142 MMOL/L (ref 136–145)

## 2022-12-27 ENCOUNTER — TRANSFERRED RECORDS (OUTPATIENT)
Dept: HEALTH INFORMATION MANAGEMENT | Facility: CLINIC | Age: 62
End: 2022-12-27

## 2023-06-06 ENCOUNTER — PATIENT OUTREACH (OUTPATIENT)
Dept: CARE COORDINATION | Facility: CLINIC | Age: 63
End: 2023-06-06
Payer: COMMERCIAL

## 2023-06-20 ENCOUNTER — PATIENT OUTREACH (OUTPATIENT)
Dept: CARE COORDINATION | Facility: CLINIC | Age: 63
End: 2023-06-20
Payer: COMMERCIAL

## 2023-09-23 ENCOUNTER — HEALTH MAINTENANCE LETTER (OUTPATIENT)
Age: 63
End: 2023-09-23

## 2023-10-24 ASSESSMENT — ENCOUNTER SYMPTOMS
EYE PAIN: 0
BREAST MASS: 0
PALPITATIONS: 0
DYSURIA: 0
COUGH: 0
SORE THROAT: 0
JOINT SWELLING: 0
HEARTBURN: 0
CHILLS: 0
ABDOMINAL PAIN: 0
DIARRHEA: 0
NERVOUS/ANXIOUS: 0
CONSTIPATION: 0
WEAKNESS: 0
HEADACHES: 0
PARESTHESIAS: 0
HEMATOCHEZIA: 0
FEVER: 0
HEMATURIA: 0
SHORTNESS OF BREATH: 0
DIZZINESS: 0
NAUSEA: 0
MYALGIAS: 0
FREQUENCY: 0
ARTHRALGIAS: 0

## 2023-10-30 NOTE — PROGRESS NOTES
Assessment/Plan:   Maria Elena is a 63 year old female here for physical     Routine adult health maintenance  Physical completed today.  Labs as below.  Vaccines as below.  Up-to-date with Pap smear.  Colonoscopy and mammogram ordered today.    Hyperlipidemia LDL goal <130  History hyperlipidemia, currently taking pravastatin, will recheck levels today.  - Lipid panel reflex to direct LDL Fasting  - CBC with platelets    Stage 3a chronic kidney disease (H)  History of CKD, will check labs as below.  - BASIC METABOLIC PANEL  - Albumin Random Urine Quantitative with Creat Ratio  - CBC with platelets    Diabetes mellitus screening  Given age, will screen for diabetes.  - Hemoglobin A1c    Hx of essential hypertension  History of high blood pressure but no longer requiring medication, will check BMP today.  - BASIC METABOLIC PANEL  - CBC with platelets    Screen for colon cancer  Due for colon cancer screening, colonoscopy ordered today.  - Colonoscopy Screening  Referral    Encounter for vaccination  Due for vaccines as below, administered today.  - COVID-19 12+ (2023-24) (PFIZER)  - INFLUENZA VACCINE 18-64Y (FLUBLOK)    Visit for screening mammogram  Due for mammogram, ordered today.  - *MA Screening Digital Bilateral    Pain of left thumb  Numbness of left thumb  Patient reports occasional pain of left thumb with relatively constant numbness; started 2 months ago, describes episode that may have been sprain of the thumb.  We will start with physical therapy and if not improving consider MRI +/- Ortho referral.  - Physical Therapy Referral       I have had an Advance Directives discussion with the patient.  The following high BMI interventions were performed this visit: encouragement to exercise and lifestyle education regarding diet    Follow up: 1 year for physical, sooner as needed    Oneyda Awan MD  [facility]      Subjective:     Usha Green is a 63 year old female who presents for an  annual exam.     Answers submitted by the patient for this visit:  Annual Preventive Visit (Submitted on 10/24/2023)  Chief Complaint: Annual Exam:  Frequency of exercise:: 1 day/week  Getting at least 3 servings of Calcium per day:: Yes  Diet:: Regular (no restrictions)  Taking medications regularly:: Yes  Medication side effects:: None  Bi-annual eye exam:: Yes  Dental care twice a year:: Yes  Sleep apnea or symptoms of sleep apnea:: None  abdominal pain: No  Blood in stool: No  Blood in urine: No  chest pain: No  chills: No  congestion: No  constipation: No  cough: No  diarrhea: No  dizziness: No  ear pain: No  eye pain: No  nervous/anxious: No  fever: No  frequency: No  genital sores: No  headaches: No  hearing loss: No  heartburn: No  arthralgias: No  joint swelling: No  peripheral edema: No  mood changes: No  myalgias: No  nausea: No  dysuria: No  palpitations: No  Skin sensation changes: No  sore throat: No  urgency: No  rash: No  shortness of breath: No  visual disturbance: No  weakness: No  pelvic pain: No  vaginal bleeding: No  vaginal discharge: No  tenderness: No  breast mass: No  breast discharge: No  Additional concerns today:: Yes  Exercise outside of work (Submitted on 10/24/2023)  Chief Complaint: Annual Exam:  Duration of exercise:: 30-45 minutes    L thumb - 2 months ago was reaching and had a sharp pain/numbness, numb ever since on the side and into the wrist a little - changed watch band so more loose, hx carpal tunnel  - no weakness and normal ROM    Retired now! Loves to travel!    Health Maintenance reviewed:  Lipid Profile: yes  Glucose Screen: yes  Colonoscopy: due  Mammogram: due    Gynecologic History  Patient's last menstrual period was 06/18/2009.  Last Pap: 2021. Results were: nml with neg HPV - due 2026    Immunization History   Administered Date(s) Administered    COVID-19 Bivalent 12+ (Pfizer) 11/09/2022    COVID-19 MONOVALENT 12+ (Pfizer) 04/12/2021, 05/03/2021    COVID-19  Monovalent 12+ (Pfizer 2022) 07/06/2022    COVID-19 Monovalent 18+ (Moderna) 12/28/2021    FLU 6-35 months 11/06/2006, 09/22/2009, 11/12/2012    Influenza (IIV3) PF 11/21/2003, 11/17/2005, 09/22/2009, 11/12/2012, 10/29/2014    Influenza Vaccine >6 months (Alfuria,Fluzone) 12/18/2013, 12/21/2015, 10/30/2020, 10/23/2021, 11/12/2022    Pneumo Conj 13-V (2010&after) 12/21/2015    TD,PF 7+ (Tenivac) 06/08/2021    TDAP Vaccine (Adacel) 08/19/2008     Immunization status: up to date and documented.    Current Outpatient Medications   Medication Sig Dispense Refill    calcium carbonate-vitamin D 600-400 MG-UNIT CHEW       clotrimazole (LOTRIMIN) 1 % external cream Apply topically 2 times daily 45 g 1    diphenhydrAMINE (BENADRYL) 25 MG tablet Take 25 mg by mouth      Flaxseed, Linseed, (FLAX SEED OIL) 1000 MG capsule Take 1 capsule by mouth daily      MULTI-VITAMIN PO once daily       pravastatin (PRAVACHOL) 80 MG tablet TAKE 1 TABLET BY MOUTH EVERY DAY 90 tablet 1     Past Medical History:   Diagnosis Date    Abnormal Pap smear of cervix 2005    ascus, neg HPV, nl since    AR (allergic rhinitis)     Arthritis     Hypercholesterolemia     Hypertension 12/16/2013    Migraines      Past Surgical History:   Procedure Laterality Date    ANKLE FRACTURE SURGERY Right 2020    APPENDECTOMY      APPENDECTOMY  1988    BIOPSY  1994    unsure of date    BIOPSY BREAST Left 2001    non cancerous  fibroidoma    CARPAL TUNNEL RELEASE RT/LT  1988    COLONOSCOPY  2012    RELEASE CARPAL TUNNEL Right 1988     Atorvastatin  Family History   Problem Relation Age of Onset    Diabetes Mother     Hypertension Mother     Lipids Mother     Osteoporosis Mother     Cardiovascular Mother         chf    Alzheimer Disease Mother         passed 2014    Hyperlipidemia Mother     Cerebrovascular Disease Mother     Macular Degeneration Mother     Hypertension Father     Glaucoma Father     Atrial fibrillation Father     Hyperlipidemia Brother     Cataracts  Brother     Cataracts Brother     Prostate Problems Brother         enlargement    Atrial fibrillation Brother     Coronary Artery Disease Maternal Grandmother     No Known Problems Maternal Grandfather     No Known Problems Paternal Grandmother     Diabetes Paternal Grandfather     Colon Cancer Paternal Grandfather         possibly    Breast Cancer Maternal Aunt         postmenopausal    Breast Cancer Maternal Aunt     Breast Cancer Other         Maternal aunt     Social History     Socioeconomic History    Marital status:      Spouse name: Not on file    Number of children: Not on file    Years of education: Not on file    Highest education level: Not on file   Occupational History    Not on file   Tobacco Use    Smoking status: Never    Smokeless tobacco: Never    Tobacco comments:     casual smoking in college   Vaping Use    Vaping Use: Never used   Substance and Sexual Activity    Alcohol use: Yes     Comment: 1/week    Drug use: Never    Sexual activity: Yes     Partners: Male     Birth control/protection: Surgical     Comment: vasectomy    Other Topics Concern    Parent/sibling w/ CABG, MI or angioplasty before 65F 55M? No     Service No    Blood Transfusions No    Caffeine Concern No    Occupational Exposure No    Hobby Hazards No    Sleep Concern No    Stress Concern No    Weight Concern No    Special Diet No    Back Care Yes     Comment: chiro in past     Exercise Yes    Bike Helmet No     Comment: n/a    Seat Belt Yes    Self-Exams Yes     Comment: sporadically    Social History Narrative    Not on file     Social Determinants of Health     Financial Resource Strain: Low Risk  (10/24/2023)    Financial Resource Strain     Within the past 12 months, have you or your family members you live with been unable to get utilities (heat, electricity) when it was really needed?: No   Food Insecurity: Low Risk  (10/24/2023)    Food Insecurity     Within the past 12 months, did you worry that your  "food would run out before you got money to buy more?: No     Within the past 12 months, did the food you bought just not last and you didn t have money to get more?: No   Transportation Needs: Low Risk  (10/24/2023)    Transportation Needs     Within the past 12 months, has lack of transportation kept you from medical appointments, getting your medicines, non-medical meetings or appointments, work, or from getting things that you need?: No   Physical Activity: Not on file   Stress: Not on file   Social Connections: Not on file   Interpersonal Safety: Low Risk  (10/31/2023)    Interpersonal Safety     Do you feel physically and emotionally safe where you currently live?: Yes     Within the past 12 months, have you been hit, slapped, kicked or otherwise physically hurt by someone?: No     Within the past 12 months, have you been humiliated or emotionally abused in other ways by your partner or ex-partner?: No   Housing Stability: Low Risk  (10/24/2023)    Housing Stability     Do you have housing? : Yes     Are you worried about losing your housing?: No       Review of Systems negative unless noted    Objective:        Vitals:    10/31/23 0748   BP: 120/70   Pulse: 65   Resp: 16   Temp: 97.6  F (36.4  C)   TempSrc: Temporal   SpO2: 98%   Weight: 80.4 kg (177 lb 4.8 oz)   Height: 1.676 m (5' 6\")   PainSc: No Pain (0)     Body mass index is 28.62 kg/m .    Physical Exam:  General Appearance: Alert, pleasant, appears stated age  Head: Normocephalic, without obvious abnormality  Eyes: PERRL, conjunctiva/corneas clear, EOM's intact  Ears: Normal TM's and external ear canals, both ears  Nose: Nares normal, septum midline,mucosa normal, no drainage  Throat: Lips, mucosa, and tongue normal; teeth and gums normal; oropharynx is clear  Neck: Supple,without lymphadenopathy, no thyromegaly or nodules noted  Lungs: Clear to auscultation bilaterally, respirations unlabored, no wheezing or crackles  Heart: Regular rate and rhythm, " no murmur   Abdomen: Soft, non-tender, no masses, no organomegaly  Extremities: Extremities with strong and symmetric pulses, no cyanosis or edema - L thumb with area of numbness/tingling, no effusion noted, no skin changes, normal ROM, no weakness  Skin: Skin color, texture normal, no rashes or lesions  Neurologic: Grossly normal, no focal deficits

## 2023-10-31 ENCOUNTER — OFFICE VISIT (OUTPATIENT)
Dept: FAMILY MEDICINE | Facility: CLINIC | Age: 63
End: 2023-10-31
Attending: FAMILY MEDICINE
Payer: COMMERCIAL

## 2023-10-31 VITALS
HEIGHT: 66 IN | BODY MASS INDEX: 28.49 KG/M2 | SYSTOLIC BLOOD PRESSURE: 120 MMHG | OXYGEN SATURATION: 98 % | TEMPERATURE: 97.6 F | HEART RATE: 65 BPM | DIASTOLIC BLOOD PRESSURE: 70 MMHG | RESPIRATION RATE: 16 BRPM | WEIGHT: 177.3 LBS

## 2023-10-31 DIAGNOSIS — Z12.11 SCREEN FOR COLON CANCER: ICD-10-CM

## 2023-10-31 DIAGNOSIS — Z86.79 HX OF ESSENTIAL HYPERTENSION: ICD-10-CM

## 2023-10-31 DIAGNOSIS — R20.0 NUMBNESS OF LEFT THUMB: ICD-10-CM

## 2023-10-31 DIAGNOSIS — M79.645 PAIN OF LEFT THUMB: ICD-10-CM

## 2023-10-31 DIAGNOSIS — Z13.1 DIABETES MELLITUS SCREENING: ICD-10-CM

## 2023-10-31 DIAGNOSIS — E78.5 HYPERLIPIDEMIA LDL GOAL <130: ICD-10-CM

## 2023-10-31 DIAGNOSIS — Z23 ENCOUNTER FOR VACCINATION: ICD-10-CM

## 2023-10-31 DIAGNOSIS — Z12.31 VISIT FOR SCREENING MAMMOGRAM: ICD-10-CM

## 2023-10-31 DIAGNOSIS — Z00.00 ROUTINE ADULT HEALTH MAINTENANCE: Primary | ICD-10-CM

## 2023-10-31 DIAGNOSIS — N18.31 STAGE 3A CHRONIC KIDNEY DISEASE (H): ICD-10-CM

## 2023-10-31 LAB
ANION GAP SERPL CALCULATED.3IONS-SCNC: 13 MMOL/L (ref 7–15)
BUN SERPL-MCNC: 17.3 MG/DL (ref 8–23)
CALCIUM SERPL-MCNC: 10 MG/DL (ref 8.8–10.2)
CHLORIDE SERPL-SCNC: 105 MMOL/L (ref 98–107)
CHOLEST SERPL-MCNC: 285 MG/DL
CREAT SERPL-MCNC: 1.02 MG/DL (ref 0.51–0.95)
CREAT UR-MCNC: 100 MG/DL
DEPRECATED HCO3 PLAS-SCNC: 22 MMOL/L (ref 22–29)
EGFRCR SERPLBLD CKD-EPI 2021: 62 ML/MIN/1.73M2
ERYTHROCYTE [DISTWIDTH] IN BLOOD BY AUTOMATED COUNT: 12.1 % (ref 10–15)
GLUCOSE SERPL-MCNC: 97 MG/DL (ref 70–99)
HBA1C MFR BLD: 5.5 % (ref 0–5.6)
HCT VFR BLD AUTO: 44 % (ref 35–47)
HDLC SERPL-MCNC: 45 MG/DL
HGB BLD-MCNC: 14.9 G/DL (ref 11.7–15.7)
LDLC SERPL CALC-MCNC: 205 MG/DL
MCH RBC QN AUTO: 29.1 PG (ref 26.5–33)
MCHC RBC AUTO-ENTMCNC: 33.9 G/DL (ref 31.5–36.5)
MCV RBC AUTO: 86 FL (ref 78–100)
MICROALBUMIN UR-MCNC: <12 MG/L
MICROALBUMIN/CREAT UR: NORMAL MG/G{CREAT}
NONHDLC SERPL-MCNC: 240 MG/DL
PLATELET # BLD AUTO: 249 10E3/UL (ref 150–450)
POTASSIUM SERPL-SCNC: 4.4 MMOL/L (ref 3.4–5.3)
RBC # BLD AUTO: 5.12 10E6/UL (ref 3.8–5.2)
SODIUM SERPL-SCNC: 140 MMOL/L (ref 135–145)
TRIGL SERPL-MCNC: 174 MG/DL
WBC # BLD AUTO: 4.8 10E3/UL (ref 4–11)

## 2023-10-31 PROCEDURE — 82570 ASSAY OF URINE CREATININE: CPT | Performed by: FAMILY MEDICINE

## 2023-10-31 PROCEDURE — 82043 UR ALBUMIN QUANTITATIVE: CPT | Performed by: FAMILY MEDICINE

## 2023-10-31 PROCEDURE — 90682 RIV4 VACC RECOMBINANT DNA IM: CPT | Performed by: FAMILY MEDICINE

## 2023-10-31 PROCEDURE — 80061 LIPID PANEL: CPT | Performed by: FAMILY MEDICINE

## 2023-10-31 PROCEDURE — 90480 ADMN SARSCOV2 VAC 1/ONLY CMP: CPT | Performed by: FAMILY MEDICINE

## 2023-10-31 PROCEDURE — 91320 SARSCV2 VAC 30MCG TRS-SUC IM: CPT | Performed by: FAMILY MEDICINE

## 2023-10-31 PROCEDURE — 90471 IMMUNIZATION ADMIN: CPT | Performed by: FAMILY MEDICINE

## 2023-10-31 PROCEDURE — 99213 OFFICE O/P EST LOW 20 MIN: CPT | Mod: 25 | Performed by: FAMILY MEDICINE

## 2023-10-31 PROCEDURE — 83036 HEMOGLOBIN GLYCOSYLATED A1C: CPT | Performed by: FAMILY MEDICINE

## 2023-10-31 PROCEDURE — 80048 BASIC METABOLIC PNL TOTAL CA: CPT | Performed by: FAMILY MEDICINE

## 2023-10-31 PROCEDURE — 85027 COMPLETE CBC AUTOMATED: CPT | Performed by: FAMILY MEDICINE

## 2023-10-31 PROCEDURE — 36415 COLL VENOUS BLD VENIPUNCTURE: CPT | Performed by: FAMILY MEDICINE

## 2023-10-31 PROCEDURE — 99396 PREV VISIT EST AGE 40-64: CPT | Mod: 25 | Performed by: FAMILY MEDICINE

## 2023-10-31 ASSESSMENT — PAIN SCALES - GENERAL: PAINLEVEL: NO PAIN (0)

## 2023-10-31 NOTE — PROGRESS NOTES
Prior to immunization administration, verified patients identity using patient s name and date of birth. Please see Immunization Activity for additional information.     Screening Questionnaire for Adult Immunization    Are you sick today?   No   Do you have allergies to medications, food, a vaccine component or latex?   No   Have you ever had a serious reaction after receiving a vaccination?   No   Do you have a long-term health problem with heart, lung, kidney, or metabolic disease (e.g., diabetes), asthma, a blood disorder, no spleen, complement component deficiency, a cochlear implant, or a spinal fluid leak?  Are you on long-term aspirin therapy?   No   Do you have cancer, leukemia, HIV/AIDS, or any other immune system problem?   No   Do you have a parent, brother, or sister with an immune system problem?   No   In the past 3 months, have you taken medications that affect  your immune system, such as prednisone, other steroids, or anticancer drugs; drugs for the treatment of rheumatoid arthritis, Crohn s disease, or psoriasis; or have you had radiation treatments?   No   Have you had a seizure, or a brain or other nervous system problem?   No   During the past year, have you received a transfusion of blood or blood    products, or been given immune (gamma) globulin or antiviral drug?   No   For women: Are you pregnant or is there a chance you could become       pregnant during the next month?   No   Have you received any vaccinations in the past 4 weeks?   No     Immunization questionnaire answers were all negative.      Patient instructed to remain in clinic for 15 minutes afterwards, and to report any adverse reactions.     Screening performed by Sue Wall MA on 10/31/2023 at 8:34 AM.

## 2023-11-01 ENCOUNTER — MYC MEDICAL ADVICE (OUTPATIENT)
Dept: FAMILY MEDICINE | Facility: CLINIC | Age: 63
End: 2023-11-01
Payer: COMMERCIAL

## 2023-11-01 DIAGNOSIS — E78.5 HYPERLIPIDEMIA LDL GOAL <130: Primary | ICD-10-CM

## 2023-11-06 RX ORDER — SIMVASTATIN 20 MG
20 TABLET ORAL AT BEDTIME
Qty: 90 TABLET | Refills: 3 | Status: SHIPPED | OUTPATIENT
Start: 2023-11-06

## 2023-11-09 ENCOUNTER — MYC MEDICAL ADVICE (OUTPATIENT)
Dept: FAMILY MEDICINE | Facility: CLINIC | Age: 63
End: 2023-11-09
Payer: COMMERCIAL

## 2023-11-09 DIAGNOSIS — R20.0 NUMBNESS OF LEFT THUMB: Primary | ICD-10-CM

## 2023-11-09 DIAGNOSIS — M79.645 PAIN OF LEFT THUMB: ICD-10-CM

## 2023-11-09 NOTE — TELEPHONE ENCOUNTER
Spoke to Kassandra at [facility] to clarify on the type of referral needed, because referral placed is already for PT.    Per Kassandra, the referral needs to be for OT not PT, because it is for the hand. Kassandra stated that all of their hand therapists are OT, not PT.    OT referral pended; routing to PCP to review and advise.    KRISTEN LozanoN, RN  [facility]

## 2023-11-20 ENCOUNTER — THERAPY VISIT (OUTPATIENT)
Dept: OCCUPATIONAL THERAPY | Facility: REHABILITATION | Age: 63
End: 2023-11-20
Attending: FAMILY MEDICINE
Payer: COMMERCIAL

## 2023-11-20 DIAGNOSIS — R20.0 NUMBNESS OF LEFT THUMB: ICD-10-CM

## 2023-11-20 DIAGNOSIS — M79.645 PAIN OF LEFT THUMB: ICD-10-CM

## 2023-11-20 PROCEDURE — 97530 THERAPEUTIC ACTIVITIES: CPT | Mod: GO | Performed by: OCCUPATIONAL THERAPIST

## 2023-11-20 PROCEDURE — 97112 NEUROMUSCULAR REEDUCATION: CPT | Mod: GO | Performed by: OCCUPATIONAL THERAPIST

## 2023-11-20 PROCEDURE — 97165 OT EVAL LOW COMPLEX 30 MIN: CPT | Mod: GO | Performed by: OCCUPATIONAL THERAPIST

## 2023-11-20 NOTE — PROGRESS NOTES
OCCUPATIONAL THERAPY EVALUATION  Type of Visit: Evaluation    See electronic medical record for Abuse and Falls Screening details.    Subjective      Presenting condition or subjective complaint: Numbness and occasional pain in thumb when reaching or twisting my hand  Date of onset:      Relevant medical history: Arthritis; High blood pressure; History of fractures; Menopause; Migraines or headaches; Numbness or tingling in perianal area; Overweight   Dates & types of surgery: Carpal tunnel: 1988; Appendix: 1989; ankle: 2020; shoulder: 2022    Patient complains of numbness, occasional pain to the radial edge of the left thumb. More severe with overhead reach and twist. History of R CTR.     Prior diagnostic imaging/testing results:       Prior therapy history for the same diagnosis, illness or injury: No      Prior Level of Function  Transfers: Independent  Ambulation: Independent  ADL: Independent  IADL: Childcare, Finances, Housekeeping, Laundry, Meal preparation, Medication management, Work, Yard work    Living Environment  Social support: With a significant other or spouse   Type of home: [facility]   Stairs to enter the home: No       Ramp: No   Stairs inside the home: No       Help at home: None  Equipment owned:       Employment: No    Hobbies/Interests: Crafts, sewing, hiking, travel    Patient goals for therapy: I would like numbness to go away, and not have pain when reaching or twisting my hand.       Objective   ADDITIONAL HISTORY:  Right hand dominant  Patient reports symptoms of pain, stiffness/loss of motion, weakness/loss of strength, edema, and numbness  Transportation: drives  Currently retired.     Functional Outcome Measure:   Upper Extremity Functional Index Score:  SCORE:   Column Totals: /80: 80   (A lower score indicates greater disability.)     PAIN:  Pain Level at Rest: 0/10  Pain Level with Use: 5/10  Pain Location: Distal, radial left thumb   Pain Quality: Sharp  Pain Frequency:  intermittent  Pain is Worst: daytime  Pain is Exacerbated By: Overhead twisting, pulling  Pain is Relieved By: rest and repositioning   Pain Progression: Unchanged    POSTURE: Normal     EDEMA:  None appreciable to palpation about the bilateral hands.       SCAR/WOUND:  Well healed carpal tunnel release incision to right wrist     SENSATION: Decreased Median Nerve distribution per pt report     ROM:  AROM of the bilateral wrist, thumb and digits WNL bilateral     SPECIAL TESTS:   CTS Special Tests  Pain Report Left   Carpal Compression Test-Durkan Test (30 sec) - (not present)   Guadalupe Test for Lumbrical Incursion (fist x30 sec) - (not present)   Tinel's at Carpal Tunnel - (not present)   Phalen's Sign - (not present)     - Positive Tinel's DSSRN (left), trace positive Finkelstein's, negative WHAT test     NEURAL TENSION TESTING: MNT: Median Neurodynamic Test (based on DS Ashwin's ULNT)   11/20/2023   0-5 Scale 5/5, S1   Position:   0/5: Arm across abdomen in coronal plane  1/5: Depress shoulder, ER to neutral ABD shoulder to 45 degrees  2/5: ER shoulder to end range, keep elbow at 90 degrees  3/5: Extend elbow to 0 degrees  4/5: Fully supinate forearm  5/5: Extend wrist, fingers and thumb  Notes:  (+) indicates beyond grade level but less than custodial to next level  (-) indicates over custodial to level  S1 onset/change of patient's symptoms  S2 definite stop point based on patient's discomfort level     - Radial neural tension (LUE) - 5/5    Assessment & Plan   CLINICAL IMPRESSIONS  Medical Diagnosis:      Treatment Diagnosis:      Impression/Assessment: Pt is a 63 year old female presenting to Occupational Therapy due to left thumb numbness.  The following significant findings have been identified: Impaired activity tolerance, Impaired coordination, Impaired ROM, Impaired sensation, Impaired strength, and Pain.  These identified deficits interfere with their ability to perform self care tasks, work tasks,  recreational activities, household chores, driving , medication management, financial management,  yard work, and meal planning and preparation as compared to previous level of function.   Patient's limitations or Problem List includes: Pain, Decreased ROM/motion, Increased edema, Weakness, Sensory disturbance, Hypomobility, Decreased , Decreased pinch, Decreased coordination, Decreased dexterity, and Tightness in musculature of the left wrist, hand, and thumb which interferes with the patient's ability to perform Self Care Tasks (dressing), Work Tasks, Sleep Patterns, Recreational Activities, Household Chores, and Driving  as compared to previous level of function.    Clinical Decision Making (Complexity):  Assessment of Occupational Performance: 1-3 Performance Deficits  Occupational Performance Limitations: toileting, dressing, communication management, driving and community mobility, health management and maintenance, home establishment and management, meal preparation and cleanup, shopping, sleep, work, leisure activities, and social participation  Clinical Decision Making (Complexity): Low complexity    PLAN OF CARE  Treatment Interventions:  Modalities:  US  Therapeutic Exercise:  AROM, AAROM, PROM, Tendon Gliding, Blocking, Reverse Blocking, Place and Hold, Contract Relax, Extensor Tracking, Isotonics, Isometrics, and Stabilization  Neuromuscular re-education:  Nerve Gliding, Coordination/Dexterity, Desensitization, Kinesthetic Training, Proprioceptive Training, Posture, Kinesiotaping, Strain Counter Strain, Isometrics, and Stabilization  Manual Techniques:  Coordination/Dexterity, Joint mobilization, Friction massage, Myofascial release, and Manual edema mobilization  Orthotic Fabrication:  Static, Hand based, and Forearm based  Self Care:  Self Care Tasks, Ergonomic Considerations, and Work Tasks    Long Term Goals          Frequency of Treatment:    Duration of Treatment:       Education Assessment:        Risks and benefits of evaluation/treatment have been explained.   Patient/Family/caregiver agrees with Plan of Care.     Evaluation Time:         Signing Clinician: Daniel Wall OT

## 2023-11-21 ENCOUNTER — HOSPITAL ENCOUNTER (OUTPATIENT)
Dept: MAMMOGRAPHY | Facility: CLINIC | Age: 63
Discharge: HOME | End: 2023-11-21
Attending: FAMILY MEDICINE | Admitting: FAMILY MEDICINE
Payer: COMMERCIAL

## 2023-11-21 DIAGNOSIS — Z12.31 VISIT FOR SCREENING MAMMOGRAM: ICD-10-CM

## 2023-11-21 PROCEDURE — 77067 SCR MAMMO BI INCL CAD: CPT

## 2023-11-27 ENCOUNTER — THERAPY VISIT (OUTPATIENT)
Dept: OCCUPATIONAL THERAPY | Facility: REHABILITATION | Age: 63
End: 2023-11-27
Attending: FAMILY MEDICINE
Payer: COMMERCIAL

## 2023-11-27 DIAGNOSIS — M79.645 PAIN OF LEFT THUMB: ICD-10-CM

## 2023-11-27 DIAGNOSIS — R20.0 NUMBNESS OF LEFT THUMB: Primary | ICD-10-CM

## 2023-11-27 PROCEDURE — 97760 ORTHOTIC MGMT&TRAING 1ST ENC: CPT | Mod: GO | Performed by: OCCUPATIONAL THERAPIST

## 2023-11-27 PROCEDURE — 97140 MANUAL THERAPY 1/> REGIONS: CPT | Mod: GO | Performed by: OCCUPATIONAL THERAPIST

## 2023-12-11 ENCOUNTER — THERAPY VISIT (OUTPATIENT)
Dept: OCCUPATIONAL THERAPY | Facility: REHABILITATION | Age: 63
End: 2023-12-11
Payer: COMMERCIAL

## 2023-12-11 DIAGNOSIS — R20.0 NUMBNESS OF LEFT THUMB: Primary | ICD-10-CM

## 2023-12-11 DIAGNOSIS — M79.645 PAIN OF LEFT THUMB: ICD-10-CM

## 2023-12-11 PROCEDURE — 97110 THERAPEUTIC EXERCISES: CPT | Mod: GO | Performed by: OCCUPATIONAL THERAPIST

## 2023-12-11 PROCEDURE — 97140 MANUAL THERAPY 1/> REGIONS: CPT | Mod: GO | Performed by: OCCUPATIONAL THERAPIST

## 2023-12-20 ENCOUNTER — HOSPITAL ENCOUNTER (OUTPATIENT)
Facility: AMBULATORY SURGERY CENTER | Age: 63
End: 2023-12-20
Attending: SURGERY
Payer: COMMERCIAL

## 2023-12-20 ENCOUNTER — TELEPHONE (OUTPATIENT)
Dept: GASTROENTEROLOGY | Facility: CLINIC | Age: 63
End: 2023-12-20
Payer: COMMERCIAL

## 2023-12-20 DIAGNOSIS — Z12.11 SPECIAL SCREENING FOR MALIGNANT NEOPLASMS, COLON: Primary | ICD-10-CM

## 2023-12-21 NOTE — TELEPHONE ENCOUNTER
"Endoscopy Scheduling Screen    Have you had a positive Covid test in the last 14 days?  No    Are you active on MyChart?   Yes    What insurance is in the chart?  Other:  BCBS    Ordering/Referring Provider: Lv   (If ordering provider performs procedure, schedule with ordering provider unless otherwise instructed. )    BMI: Estimated body mass index is 28.62 kg/m  as calculated from the following:    Height as of 10/31/23: 1.676 m (5' 6\").    Weight as of 10/31/23: 80.4 kg (177 lb 4.8 oz).     Sedation Ordered  moderate sedation.   If patient BMI > 50 do not schedule in ASC.    If patient BMI > 45 do not schedule at [facility].    Are you taking methadone or Suboxone?  No    Are you taking any prescription medications for pain 3 or more times per week?   NO - No RN review required.    Do you have a history of malignant hyperthermia or adverse reaction to anesthesia?  No    (Females) Are you currently pregnant?   No     Have you been diagnosed or told you have pulmonary hypertension?   No    Do you have an LVAD?  No    Have you been told you have moderate to severe sleep apnea?  No    Have you been told you have COPD, asthma, or any other lung disease?  No    Do you have any heart conditions?  No     Have you ever had an organ transplant?   No    Have you ever had or are you awaiting a heart or lung transplant?   No    Have you had a stroke or transient ischemic attack (TIA aka \"mini stroke\" in the last 6 months?   No    Have you been diagnosed with or been told you have cirrhosis of the liver?   No    Are you currently on dialysis?   No    Do you need assistance transferring?   No    BMI: Estimated body mass index is 28.62 kg/m  as calculated from the following:    Height as of 10/31/23: 1.676 m (5' 6\").    Weight as of 10/31/23: 80.4 kg (177 lb 4.8 oz).     Is patients BMI > 40 and scheduling location [facility]?  No    Do you take an injectable medication for weight loss or diabetes (excluding insulin)?  No    Do you take " the medication Naltrexone?  No    Do you take blood thinners?  No       Prep   Are you currently on dialysis or do you have chronic kidney disease?  Yes (Golytely Prep)    Do you have a diagnosis of diabetes?  No    Do you have a diagnosis of cystic fibrosis (CF)?  No    On a regular basis do you go 3 -5 days between bowel movements?  No    BMI > 40?  No    Preferred Pharmacy:    [facility]/pharmacy #[id number] - [place], [place] - [address] AT [facility]  [address] [address]  Phone: [phone] Fax: [phone]      Final Scheduling Details   Colonoscopy prep sent?  Standard Golytely    Procedure scheduled  Colonoscopy    Surgeon:  SACHI     Date of procedure:  04/24/2024     Pre-OP / PAC:   No - Not required for this site.    Location  [facility] - Patient preference.    Sedation   MAC/Deep Sedation  per location      Patient Reminders:   You will receive a call from a Nurse to review instructions and health history.  This assessment must be completed prior to your procedure.  Failure to complete the Nurse assessment may result in the procedure being cancelled.      On the day of your procedure, please designate an adult(s) who can drive you home stay with you for the next 24 hours. The medicines used in the exam will make you sleepy. You will not be able to drive.      You cannot take public transportation, ride share services, or non-medical taxi service without a responsible caregiver.  Medical transport services are allowed with the requirement that a responsible caregiver will receive you at your destination.  We require that drivers and caregivers are confirmed prior to your procedure.

## 2024-01-08 ENCOUNTER — THERAPY VISIT (OUTPATIENT)
Dept: OCCUPATIONAL THERAPY | Facility: REHABILITATION | Age: 64
End: 2024-01-08
Payer: COMMERCIAL

## 2024-01-08 DIAGNOSIS — M79.645 PAIN OF LEFT THUMB: ICD-10-CM

## 2024-01-08 DIAGNOSIS — R20.0 NUMBNESS OF LEFT THUMB: Primary | ICD-10-CM

## 2024-01-08 PROCEDURE — 97530 THERAPEUTIC ACTIVITIES: CPT | Mod: GO | Performed by: OCCUPATIONAL THERAPIST

## 2024-01-08 PROCEDURE — 97140 MANUAL THERAPY 1/> REGIONS: CPT | Mod: GO | Performed by: OCCUPATIONAL THERAPIST

## 2024-03-04 ENCOUNTER — THERAPY VISIT (OUTPATIENT)
Dept: OCCUPATIONAL THERAPY | Facility: REHABILITATION | Age: 64
End: 2024-03-04
Payer: COMMERCIAL

## 2024-03-04 DIAGNOSIS — R20.0 NUMBNESS OF LEFT THUMB: Primary | ICD-10-CM

## 2024-03-04 DIAGNOSIS — M79.645 PAIN OF LEFT THUMB: ICD-10-CM

## 2024-03-04 PROCEDURE — 97110 THERAPEUTIC EXERCISES: CPT | Mod: GO | Performed by: OCCUPATIONAL THERAPIST

## 2024-03-04 PROCEDURE — 97112 NEUROMUSCULAR REEDUCATION: CPT | Mod: GO | Performed by: OCCUPATIONAL THERAPIST

## 2024-03-04 NOTE — PROGRESS NOTES
03/04/24 0500   Appointment Info   Treating Provider Daniel Wall, RAGINI, OTR/L, CLT   Total/Authorized Visits 8 (POC)   Visits Used 5   Medical Diagnosis Numbness of left thumb, pain of left thumb   OT Tx Diagnosis Numbness of left thumb, pain of left thumb   Precautions/Limitations DC, recommmend follow-up with sports medicine in 6-8 weeks if no further improvement is noted.   Other pertinent information Consistent with Wartenburg's syndrome, DSSRN entrapment/irritation   Progress Note/Certification   Onset of Illness/Injury or Date of Surgery 11/09/23   Therapy Frequency 1x/week   Predicted Duration 8 weeks   Progress Note Due Date 01/15/24   Progress Note Completed Date 03/04/24   OT Goal 1   Goal Identifier Goal I   Goal Description Patient will open a tight or new jar with minimal to no pain (0-1/10) and/or difficulty.   Rationale In order to maximize safety and independence with performance of self-care activities   Goal Progress Achieved   Target Date 01/15/24   Date Met 03/04/24   Subjective Report   Subjective Report My numbness is subsiding but I have some tingling, which I think is a good sign. I don't have pain anymore, either.   Objective Measures   Objective Measures Objective Measure 1;Objective Measure 2   Objective Measure 1   Objective Measure 0/10   Details Pain at rest   Objective Measure 2   Objective Measure 0/10, only numbness to dorsal thumb   Details Pain with activity   Treatment Interventions (OT)   Interventions Neuromuscular Re-education;Therapeutic Procedure/Exercise;Therapeutic Activity;Orthotics;Manual Therapy   Neuromuscular Re-education   Neuromuscular Re-ed Minutes (43465) 10   Neuro Re-ed 1 Kinesiotape functional correction insert to origin along DSSRN, APL and APB at 20% tension for reduced tendon and nerve irritation with daily activity.   Neuro Re-ed 1 - Details Apply PRN, re-education on application and purpose.   PTRx Neuro Re-ed 1 Radial Nerve Mobility   PTRx Neuro Re-ed  1 - Details HEP, reviewed. 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 2 x 10 in-clinic with education and assessment of tolerance.   Skilled Intervention For improved median/radial nerve excursion reducing pain and paresthesia with ADL and IADL.   Patient Response/Progress Tolerated well, patient demonstrated and verbalized understanding.   Therapeutic Procedure/Exercise   Therapeutic Procedure: strength, endurance, ROM, flexibillity minutes (75986) 16   PTRx Ther Proc 1 Forearm Passive Range of Motion Extensor Stretch   PTRx Ther Proc 1 - Details HEP   PTRx Ther Proc 2 Wrist Passive Range of Motion DeQuervain's Stretch   PTRx Ther Proc 2 - Details EHP   Skilled Intervention For improved radial nerve excursion about the first dorsal wrist compartment, relieving pain and paresthesia with ADL and IADL.   Patient Response/Progress Tolerated well, patient demonstrated and verbalized understanding.   Ther Proc 1 Reviewed HEP and ongoing activity modification strategies in order to mitigate radial nerve compression at the BR/ECRB, including reduction of tightness on watch strap/heavily pronated tabletop work. Education regarding anticipated course of recovery, though recommend further workup with sports medicine in 6-8 weeks if steady improvement is not noted.   Therapeutic Activity   PTRx Ther Act 1 Friction Massage   PTRx Ther Act 1 - Details HEP   Skilled Intervention For improved soft tissue extensibility, improving cranial nerve excursion reducing pain and paresthesia with ADL and IADL.   Patient Response/Progress Tolerated well, patient demonstrated and verbalized understanding.   Education   Learner/Method Patient;Demonstration;Listening;Reading;Pictures/Video;No Barriers to Learning   Plan   Home program Per PTRX, implement nerve protective strategies for the median and DSSRN. DC orthotics, tape PRN.   Updates to  plan of care DC   Total Session Time   Timed Code Treatment Minutes 26   Total Treatment Time (sum of timed and untimed services) 26         DISCHARGE  Reason for Discharge: Patient has met all goals.  Patient chooses to discontinue therapy.    Discharge Plan: Patient to continue home program.  Other services: Sports medicine in 6-8 weeks if further improvement is not noted.    Referring Provider:  Oneyda Awan

## 2024-04-02 RX ORDER — BISACODYL 5 MG/1
TABLET, DELAYED RELEASE ORAL
Qty: 4 TABLET | Refills: 0 | Status: ON HOLD | OUTPATIENT
Start: 2024-04-02 | End: 2024-07-02

## 2024-04-03 ENCOUNTER — TELEPHONE (OUTPATIENT)
Dept: GASTROENTEROLOGY | Facility: CLINIC | Age: 64
End: 2024-04-03
Payer: COMMERCIAL

## 2024-04-03 NOTE — TELEPHONE ENCOUNTER
Caller: Usha Green      Reason for Reschedule/Cancellation   (please be detailed, any staff messages or encounters to note?):  has schedule conflict      Prior to reschedule please review:  Ordering Provider: PRATIMA RUANO  Sedation Determined: CS  Does patient have any ASC Exclusions, please identify?: NO      Notes on Cancelled Procedure:  Procedure: Lower Endoscopy [Colonoscopy]   Date: 4/24/24  Location: [facility]; [address], [address], [place] [address]  Surgeon: SACHI      Rescheduled: Yes,   Procedure: Lower Endoscopy [Colonoscopy]    Date: 7/2/24   Location: [facility]; [address] [place] [address]   Surgeon: TINA   Sedation Level Scheduled  CS ,  Reason for Sedation Level PER ORDER   Instructions updated and sent: Y     Does patient need PAC or Pre -Op Rescheduled? : N       Did you cancel or rescheduled an EUS procedure? No.

## 2024-06-04 NOTE — TELEPHONE ENCOUNTER
Standard Golytely Bowel Prep. Instructions were sent via [employer]. Bowel prep was sent 4/2/2024 to [facility]/PHARMACY #[id number] - [place], [place] - [address] AT [facility] [address] & [address].

## 2024-06-25 ENCOUNTER — TELEPHONE (OUTPATIENT)
Dept: GASTROENTEROLOGY | Facility: CLINIC | Age: 64
End: 2024-06-25

## 2024-06-25 NOTE — TELEPHONE ENCOUNTER
Pre visit planning completed.      Procedure details:    Patient scheduled for Colonoscopy  on 07.02.2024.     Arrival time: 1015. Procedure time 1100    Facility location: [facility]; [address] [place] [address]. Check in location: Main entrance, door #1 on the North side of the building under roundabout awning. DO NOT GO TO SURGERY/ED ENTRANCE.     Sedation type: Conscious sedation     Pre op exam needed? N/A    Indication for procedure: Screen for colon cancer       Chart review:     Electronic implanted devices? No    Recent diagnosis of diverticulitis within the last 6 weeks? No    Diabetic? No      Medication review:    Anticoagulants? No    NSAIDS? No NSAID medications per patient's medication list.  RN will verify with pre-assessment call.    Other medication HOLDING recommendations:  N/A      Prep for procedure:     Bowel prep recommendation: Standard Golytely. Bowel prep prescription sent to [facility]/PHARMACY #[id number] [place], [place] - [address] AT [address]  Due to: CKD noted.     Prep instructions sent via OneWire         Sunita Morin RN  Endoscopy Procedure Pre Assessment RN  [phone] option 4     Improved

## 2024-06-25 NOTE — TELEPHONE ENCOUNTER
Pre assessment completed for upcoming procedure.   (Please see previous telephone encounter notes for complete details)      Procedure details:    Arrival time and facility location reviewed.    Pre op exam needed? N/A    Designated  policy reviewed. Instructed to have someone stay 6  hours post procedure.       Medication review:    Medications reviewed. Please see supporting documentation below. Holding recommendations discussed (if applicable).   N/A      Prep for procedure:     Procedure prep instructions reviewed.        Any additional information needed:  N/A      Patient  verbalized understanding and had no questions or concerns at this time.      Sunita Morin RN  Endoscopy Procedure Pre Assessment   [phone] option 4

## 2024-07-02 ENCOUNTER — HOSPITAL ENCOUNTER (OUTPATIENT)
Facility: CLINIC | Age: 64
Discharge: HOME | End: 2024-07-02
Attending: INTERNAL MEDICINE | Admitting: INTERNAL MEDICINE
Payer: COMMERCIAL

## 2024-07-02 VITALS
DIASTOLIC BLOOD PRESSURE: 73 MMHG | HEART RATE: 85 BPM | WEIGHT: 177 LBS | RESPIRATION RATE: 18 BRPM | BODY MASS INDEX: 28.45 KG/M2 | HEIGHT: 66 IN | SYSTOLIC BLOOD PRESSURE: 126 MMHG | OXYGEN SATURATION: 92 %

## 2024-07-02 LAB — COLONOSCOPY: NORMAL

## 2024-07-02 PROCEDURE — 250N000011 HC RX IP 250 OP 636: Performed by: INTERNAL MEDICINE

## 2024-07-02 PROCEDURE — G0500 MOD SEDAT ENDO SERVICE >5YRS: HCPCS | Performed by: INTERNAL MEDICINE

## 2024-07-02 PROCEDURE — G0121 COLON CA SCRN NOT HI RSK IND: HCPCS | Performed by: INTERNAL MEDICINE

## 2024-07-02 PROCEDURE — 45378 DIAGNOSTIC COLONOSCOPY: CPT | Performed by: INTERNAL MEDICINE

## 2024-07-02 RX ORDER — DIPHENHYDRAMINE HYDROCHLORIDE 50 MG/ML
25-50 INJECTION INTRAMUSCULAR; INTRAVENOUS
Status: DISCONTINUED | OUTPATIENT
Start: 2024-07-02 | End: 2024-07-02 | Stop reason: HOSPADM

## 2024-07-02 RX ORDER — ATROPINE SULFATE 0.1 MG/ML
1 INJECTION INTRAVENOUS
Status: DISCONTINUED | OUTPATIENT
Start: 2024-07-02 | End: 2024-07-02 | Stop reason: HOSPADM

## 2024-07-02 RX ORDER — ONDANSETRON 2 MG/ML
4 INJECTION INTRAMUSCULAR; INTRAVENOUS EVERY 6 HOURS PRN
Status: DISCONTINUED | OUTPATIENT
Start: 2024-07-02 | End: 2024-07-02 | Stop reason: HOSPADM

## 2024-07-02 RX ORDER — FLUMAZENIL 0.1 MG/ML
0.2 INJECTION, SOLUTION INTRAVENOUS
Status: DISCONTINUED | OUTPATIENT
Start: 2024-07-02 | End: 2024-07-02 | Stop reason: HOSPADM

## 2024-07-02 RX ORDER — NALOXONE HYDROCHLORIDE 0.4 MG/ML
0.4 INJECTION, SOLUTION INTRAMUSCULAR; INTRAVENOUS; SUBCUTANEOUS
Status: DISCONTINUED | OUTPATIENT
Start: 2024-07-02 | End: 2024-07-02 | Stop reason: HOSPADM

## 2024-07-02 RX ORDER — SIMETHICONE 40MG/0.6ML
133 SUSPENSION, DROPS(FINAL DOSAGE FORM)(ML) ORAL
Status: DISCONTINUED | OUTPATIENT
Start: 2024-07-02 | End: 2024-07-02 | Stop reason: HOSPADM

## 2024-07-02 RX ORDER — ONDANSETRON 2 MG/ML
4 INJECTION INTRAMUSCULAR; INTRAVENOUS
Status: COMPLETED | OUTPATIENT
Start: 2024-07-02 | End: 2024-07-02

## 2024-07-02 RX ORDER — NALOXONE HYDROCHLORIDE 0.4 MG/ML
0.2 INJECTION, SOLUTION INTRAMUSCULAR; INTRAVENOUS; SUBCUTANEOUS
Status: DISCONTINUED | OUTPATIENT
Start: 2024-07-02 | End: 2024-07-02 | Stop reason: HOSPADM

## 2024-07-02 RX ORDER — PROCHLORPERAZINE MALEATE 10 MG
10 TABLET ORAL EVERY 6 HOURS PRN
Status: DISCONTINUED | OUTPATIENT
Start: 2024-07-02 | End: 2024-07-02 | Stop reason: HOSPADM

## 2024-07-02 RX ORDER — LIDOCAINE 40 MG/G
CREAM TOPICAL
Status: DISCONTINUED | OUTPATIENT
Start: 2024-07-02 | End: 2024-07-02 | Stop reason: HOSPADM

## 2024-07-02 RX ORDER — ONDANSETRON 4 MG/1
4 TABLET, ORALLY DISINTEGRATING ORAL EVERY 6 HOURS PRN
Status: DISCONTINUED | OUTPATIENT
Start: 2024-07-02 | End: 2024-07-02 | Stop reason: HOSPADM

## 2024-07-02 RX ORDER — EPINEPHRINE 1 MG/ML
0.1 INJECTION, SOLUTION INTRAMUSCULAR; SUBCUTANEOUS
Status: DISCONTINUED | OUTPATIENT
Start: 2024-07-02 | End: 2024-07-02 | Stop reason: HOSPADM

## 2024-07-02 RX ORDER — FENTANYL CITRATE 50 UG/ML
50-100 INJECTION, SOLUTION INTRAMUSCULAR; INTRAVENOUS EVERY 5 MIN PRN
Status: DISCONTINUED | OUTPATIENT
Start: 2024-07-02 | End: 2024-07-02 | Stop reason: HOSPADM

## 2024-07-02 RX ADMIN — FENTANYL CITRATE 100 MCG: 0.05 INJECTION, SOLUTION INTRAMUSCULAR; INTRAVENOUS at 10:50

## 2024-07-02 RX ADMIN — FENTANYL CITRATE 50 MCG: 0.05 INJECTION, SOLUTION INTRAMUSCULAR; INTRAVENOUS at 10:56

## 2024-07-02 RX ADMIN — ONDANSETRON 4 MG: 2 INJECTION INTRAMUSCULAR; INTRAVENOUS at 11:30

## 2024-07-02 RX ADMIN — MIDAZOLAM 2 MG: 1 INJECTION INTRAMUSCULAR; INTRAVENOUS at 10:50

## 2024-07-02 RX ADMIN — MIDAZOLAM 1 MG: 1 INJECTION INTRAMUSCULAR; INTRAVENOUS at 10:55

## 2024-07-02 ASSESSMENT — ACTIVITIES OF DAILY LIVING (ADL)
ADLS_ACUITY_SCORE: 35
ADLS_ACUITY_SCORE: 35

## 2024-07-02 NOTE — H&P
Pre-Endoscopy History and Physical     Usha Green MRN# [id number]   YOB: 1960 Age: 64 year old     Date of Procedure: 7/2/2024  Primary care provider: Oneyda Awan  Type of Endoscopy: Colonoscopy with possible biopsy, possible polypectomy  Reason for Procedure: screen  Type of Anesthesia Anticipated: Conscious Sedation    HPI:    Usha is a 64 year old female who will be undergoing the above procedure.      A history and physical has been performed. The patient's medications and allergies have been reviewed. The risks and benefits of the procedure and the sedation options and risks were discussed with the patient.  All questions were answered and informed consent was obtained.      She denies a personal or family history of anesthesia complications or bleeding disorders.     Patient Active Problem List   Diagnosis    Hyperlipidemia LDL goal <130    CKD (chronic kidney disease) stage 3, GFR 30-59 ml/min (H)    Hx of essential hypertension        Past Medical History:   Diagnosis Date    Abnormal Pap smear of cervix 2005    ascus, neg HPV, nl since    AR (allergic rhinitis)     Arthritis     Hypercholesterolemia     Hypertension 12/16/2013    Migraines         Past Surgical History:   Procedure Laterality Date    ANKLE FRACTURE SURGERY Right 2020    APPENDECTOMY      APPENDECTOMY  1988    BIOPSY  1994    unsure of date    BIOPSY BREAST Left 2001    non cancerous  fibroidoma    CARPAL TUNNEL RELEASE RT/LT  1988    COLONOSCOPY  2012    RELEASE CARPAL TUNNEL Right 1988       Social History     Tobacco Use    Smoking status: Never    Smokeless tobacco: Never    Tobacco comments:     casual smoking in college   Substance Use Topics    Alcohol use: Yes     Comment: 1/week       Family History   Problem Relation Age of Onset    Diabetes Mother     Hypertension Mother     Lipids Mother     Osteoporosis Mother     Cardiovascular Mother         chf    Alzheimer Disease Mother         passed  2014    Hyperlipidemia Mother     Cerebrovascular Disease Mother     Macular Degeneration Mother     Hypertension Father     Glaucoma Father     Atrial fibrillation Father     Coronary Artery Disease Maternal Grandmother     No Known Problems Maternal Grandfather     No Known Problems Paternal Grandmother     Diabetes Paternal Grandfather     Colon Cancer Paternal Grandfather         possibly    Hyperlipidemia Brother     Cataracts Brother     Cataracts Brother     Prostate Problems Brother         enlargement    Atrial fibrillation Brother     Breast Cancer Maternal Aunt         postmenopausal       Prior to Admission medications    Medication Sig Start Date End Date Taking? Authorizing Provider   bisacodyl (DULCOLAX) 5 MG EC tablet Take 2 tablets at 3 pm the day before your procedure. If your procedure is before 11 am, take 2 additional tablets at 11 pm. If your procedure is after 11 am, take 2 additional tablets at 6 am. For additional instructions refer to your colonoscopy prep instructions. 4/2/24   Joseph Hyde, DO   calcium carbonate-vitamin D 600-400 MG-UNIT CHEW     Reported, Patient   clotrimazole (LOTRIMIN) 1 % external cream Apply topically 2 times daily 7/6/22   Oneyda Awan MD   diphenhydrAMINE (BENADRYL) 25 MG tablet Take 25 mg by mouth    Reported, Patient   Flaxseed, Linseed, (FLAX SEED OIL) 1000 MG capsule Take 1 capsule by mouth daily    Reported, Patient   MULTI-VITAMIN PO once daily     Reported, Patient   polyethylene glycol (GOLYTELY) 236 g suspension The night before the exam at 6 pm drink an 8-ounce glass every 15 minutes until the jug is half empty. If you arrive before 11 AM: Drink the other half of the Golytely jug at 11 PM night before procedure. If you arrive after 11 AM: Drink the other half of the Golytely jug at 6 AM day of procedure. For additional instructions refer to your colonoscopy prep instructions. 4/2/24   Joseph Hyde, DO   simvastatin (ZOCOR) 20 MG tablet Take 1  "tablet (20 mg) by mouth at bedtime 11/6/23   Oneyda Awan MD       Allergies   Allergen Reactions    Atorvastatin Cramps     Muscle pain        REVIEW OF SYSTEMS:   5 point ROS negative except as noted above in HPI, including Gen., Resp., CV, GI &  system review.    PHYSICAL EXAM:   [facility] 06/18/2009  Estimated body mass index is 28.62 kg/m  as calculated from the following:    Height as of 10/31/23: 1.676 m (5' 6\").    Weight as of 10/31/23: 80.4 kg (177 lb 4.8 oz).   GENERAL APPEARANCE: alert, and oriented  MENTAL STATUS: alert  AIRWAY EXAM: Mallampatti Class I (visualization of the soft palate, fauces, uvula, anterior and posterior pillars)  RESP: lungs clear to auscultation - no rales, rhonchi or wheezes  CV: regular rates and rhythm  DIAGNOSTICS:    Not indicated    IMPRESSION   ASA Class 2 - Mild systemic disease    PLAN:   Plan for Colonoscopy with possible biopsy, possible polypectomy. We discussed the risks, benefits and alternatives and the patient wished to proceed.    The above has been forwarded to the consulting provider.      Signed Electronically by: Moe Fonseca MD  July 2, 2024          "

## 2024-10-18 DIAGNOSIS — E78.5 HYPERLIPIDEMIA LDL GOAL <130: ICD-10-CM

## 2024-10-18 RX ORDER — SIMVASTATIN 20 MG
20 TABLET ORAL AT BEDTIME
Qty: 90 TABLET | Refills: 3 | Status: SHIPPED | OUTPATIENT
Start: 2024-10-18

## 2024-12-02 NOTE — PROGRESS NOTES
Assessment/Plan:   Maria Elena is a 64 year old female here for physical     Routine adult health maintenance  Physical completed today.  Labs as below.  Patient will look into shingles coverage and get either at clinic or pharmacy, will consider RSV vaccine.  Mammogram ordered.  Up-to-date with colon cancer screening and Pap smear.  - Vitamin D deficiency screening    Hyperlipidemia LDL goal <130  History of hyperlipidemia, currently taking simvastatin, recheck levels today.  - Lipid panel reflex to direct LDL Non-fasting    Stage 3a chronic kidney disease (H)  History of CKD, will check labs today.  - BASIC METABOLIC PANEL  - Albumin Random Urine Quantitative with Creat Ratio  - CBC with platelets    Hx of essential hypertension  History of hypertension, blood pressure within normal limits, patient not on medication at this time.  - BASIC METABOLIC PANEL    Diabetes mellitus screening  Given age will screen for diabetes today with A1c.  - Hemoglobin A1c    Screening mammogram for breast cancer  Due for mammogram, ordered today.  - MA Screen Bilateral w/Buddy       I have had an Advance Directives discussion with the patient.    Follow up: 1 year for physical, sooner as needed    Oneyda Awan MD  [facility]      Subjective:     Usha Green is a 64 year old female who presents for an annual exam.     Question 12/3/2024  4:54 PM CST - Filed by Patient   In general, how would you rate your overall physical health? Good   Do you get at least 3 servings of foods that have calcium each day (dairy, green leafy vegetables, etc)? Yes   Do you have a special diet? Regular (no restrictions)   How many servings of fruits and vegetables do you eat daily? (!) 2-3   On average, how many sweetened beverages do you drink each day (Examples: soda, juice, sweet tea, etc.)? Do NOT count diet or artificially sweetened beverages. 0-1   On average, how many days per week do you engage in moderate to strenuous exercise  (like a brisk walk)? 1 day   On average, how many minutes do you engage in exercise at this level? 40 min   How often do you get together with friends or relatives? Once a week   Have you fallen 2 or more times in the past year? No   Trouble with walking or balance? No   Do you see a dentist two times every year? Yes   Do you feel stress - tense, restless, nervous, or anxious, or unable to sleep at night because your mind is troubled all the time - these days? Not at all   If you drink alcohol, do you typically have more than 3 drinks per day OR more than 7 drinks per week? No   Do you use any substances not prescribed by a provider, out of habit or for other reasons? No   Have you had any new sexual partners since you were last tested for sexually transmitted infections, including HIV? No   Within the past 12 months, did you worry that your food would run out before you got money to buy more? No   Within the past 12 months, did the food you bought just not last and you didn t have money to get more? No   Do you have housing? (Housing is defined as stable permanent housing and does not include staying ouside in a car, in a tent, in an abandoned building, in an overnight shelter, or couch-surfing.) Yes   Are you worried about losing your housing? No   Within the past 12 months, has lack of transportation kept you from medical appointments, getting your medicines, non-medical meetings or appointments, work, or from getting things that you need? No   Within the past 12 months, have you or your family members you live with been unable to get utilities (heat, electricity) when it was really needed? No   Were you born outside of the [place]? No     Question 12/3/2024  4:54 PM CST - Filed by Patient   The following questionnaire should only be answered by the patient. Are you the patient? Yes   Over the last 2 weeks, how often have you been bothered by any of the following problems?    Q1: Little interest or pleasure in doing  things Not at all   Q2: Feeling down, depressed or hopeless Not at all   PHQ2 (   1999 [employer],ALL RIGHTS RESERVED. USED WITH PERMISSION. DEVELOPED BY DRSAsher NEGRO,LEV BUTCHER,MERCEDEZ STANTON AND [employer],WITH AN EDUCATIONAL AMANDA FROM [employer].)    PHQ-2 Score (range: 0 - 6) 0     Doing well  Seeing chiropractor for lower back - ongoing hip pain has resolved!    Health Maintenance reviewed:  Lipid Profile: yes  Glucose Screen: yes  Colonoscopy: up to date  Mammogram: due    Gynecologic History  Patient's last menstrual period was 06/18/2009.  Contraception: post menopausal status  Last Pap: 2021. Results were: nml - due in 5 yrs    Immunization History   Administered Date(s) Administered    COVID-19 12+ (Pfizer) 10/31/2023, 09/18/2024    COVID-19 Bivalent 12+ (Pfizer) 11/09/2022    COVID-19 MONOVALENT 12+ (Pfizer) 04/12/2021, 05/03/2021    COVID-19 Monovalent 12+ (Pfizer 2022) 07/06/2022    COVID-19 Monovalent 18+ (Moderna) 12/28/2021    INFLUENZA,TRIVALENT (FLUCELVAX) 09/18/2024    Influenza (IIV3) PF 11/21/2003, 11/17/2005, 09/22/2009, 11/12/2012, 10/29/2014    Influenza (prior to 2024) 11/06/2006, 09/22/2009, 11/12/2012    Influenza Vaccine 18-64 (Flublok) 10/31/2023    Influenza Vaccine >6 months,quad, PF 12/18/2013, 12/21/2015, 10/30/2020, 10/23/2021, 11/12/2022    Pneumo Conj 13-V (2010&after) 12/21/2015    TD,PF 7+ (Tenivac) 06/08/2021    TDAP Vaccine (Adacel) 08/19/2008     Immunization status: up to date and documented.    Current Outpatient Medications   Medication Sig Dispense Refill    calcium carbonate-vitamin D 600-400 MG-UNIT CHEW       clotrimazole (LOTRIMIN) 1 % external cream Apply topically 2 times daily 45 g 1    diphenhydrAMINE (BENADRYL) 25 MG tablet Take 25 mg by mouth      Flaxseed, Linseed, (FLAX SEED OIL) 1000 MG capsule Take 1 capsule by mouth daily      MULTI-VITAMIN PO once daily       simvastatin (ZOCOR) 20 MG tablet TAKE 1 TABLET BY MOUTH AT BEDTIME 90 tablet 3      Past Medical History:   Diagnosis Date    Abnormal Pap smear of cervix 2005    ascus, neg HPV, nl since    AR (allergic rhinitis)     Arthritis     Hypercholesterolemia     Hypertension 12/16/2013    Migraines      Past Surgical History:   Procedure Laterality Date    ANKLE FRACTURE SURGERY Right 2020    APPENDECTOMY      APPENDECTOMY  1988    BIOPSY  1994    unsure of date    BIOPSY BREAST Left 2001    non cancerous  fibroidoma    CARPAL TUNNEL RELEASE RT/LT  1988    COLONOSCOPY  2012    COLONOSCOPY N/A 7/2/2024    Procedure: Colonoscopy;  Surgeon: Moe Fonseca MD;  Location: [facility]    RELEASE CARPAL TUNNEL Right 1988     Atorvastatin  Family History   Problem Relation Age of Onset    Diabetes Mother     Hypertension Mother     Lipids Mother     Osteoporosis Mother     Cardiovascular Mother         chf    Alzheimer Disease Mother         passed 2014    Hyperlipidemia Mother     Cerebrovascular Disease Mother     Macular Degeneration Mother     Hypertension Father     Glaucoma Father     Atrial fibrillation Father     Coronary Artery Disease Maternal Grandmother     No Known Problems Maternal Grandfather     No Known Problems Paternal Grandmother     Diabetes Paternal Grandfather     Colon Cancer Paternal Grandfather         possibly    Hyperlipidemia Brother     Cataracts Brother     Cataracts Brother     Prostate Problems Brother         enlargement    Atrial fibrillation Brother     Breast Cancer Maternal Aunt         postmenopausal     Social History     Socioeconomic History    Marital status:      Spouse name: Not on file    Number of children: Not on file    Years of education: Not on file    Highest education level: Not on file   Occupational History    Not on file   Tobacco Use    Smoking status: Never    Smokeless tobacco: Never    Tobacco comments:     casual smoking in college   Vaping Use    Vaping status: Never Used   Substance and Sexual Activity    Alcohol use: Yes     Comment: 1/week     Drug use: Never    Sexual activity: Yes     Partners: Male     Birth control/protection: Surgical     Comment: vasectomy    Other Topics Concern    Parent/sibling w/ CABG, MI or angioplasty before 65F 55M? No     Service No    Blood Transfusions No    Caffeine Concern No    Occupational Exposure No    Hobby Hazards No    Sleep Concern No    Stress Concern No    Weight Concern No    Special Diet No    Back Care Yes     Comment: chiro in past     Exercise Yes    Bike Helmet No     Comment: n/a    Seat Belt Yes    Self-Exams Yes     Comment: sporadically    Social History Narrative    Not on file     Social Drivers of Health     Financial Resource Strain: Low Risk  (12/3/2024)    Financial Resource Strain     Within the past 12 months, have you or your family members you live with been unable to get utilities (heat, electricity) when it was really needed?: No   Food Insecurity: Low Risk  (12/3/2024)    Food Insecurity     Within the past 12 months, did you worry that your food would run out before you got money to buy more?: No     Within the past 12 months, did the food you bought just not last and you didn t have money to get more?: No   Transportation Needs: Low Risk  (12/3/2024)    Transportation Needs     Within the past 12 months, has lack of transportation kept you from medical appointments, getting your medicines, non-medical meetings or appointments, work, or from getting things that you need?: No   Physical Activity: Insufficiently Active (12/3/2024)    Exercise Vital Sign     Days of Exercise per Week: 1 day     Minutes of Exercise per Session: 40 min   Stress: No Stress Concern Present (12/3/2024)    [employer] [place] [place] [employer] - Occupational Stress Questionnaire     Feeling of Stress : Not at all   Social Connections: Unknown (12/3/2024)    Social Connection and Isolation Panel [NHANES]     Frequency of Communication with Friends and Family: Not on file     Frequency of Social  "Gatherings with Friends and Family: Once a week     Attends Confucianist Services: Not on file     Active Member of Clubs or Organizations: Not on file     Attends Club or Organization Meetings: Not on file     Marital Status: Not on file   Interpersonal Safety: Low Risk  (12/4/2024)    Interpersonal Safety     Do you feel physically and emotionally safe where you currently live?: Yes     Within the past 12 months, have you been hit, slapped, kicked or otherwise physically hurt by someone?: No     Within the past 12 months, have you been humiliated or emotionally abused in other ways by your partner or ex-partner?: No   Housing Stability: Low Risk  (12/3/2024)    Housing Stability     Do you have housing? : Yes     Are you worried about losing your housing?: No       Review of Systems negative unless noted    Objective:        Vitals:    12/04/24 1450   BP: 120/70   Pulse: 90   Resp: 18   Temp: 97.6  F (36.4  C)   TempSrc: Temporal   SpO2: 98%   Weight: 82.1 kg (180 lb 14.4 oz)   Height: 1.676 m (5' 6\")   PainSc: No Pain (0)     Body mass index is 29.2 kg/m .    Physical Exam:  General Appearance: Alert, pleasant, appears stated age  Head: Normocephalic, without obvious abnormality  Eyes: PERRL, conjunctiva/corneas clear, EOM's intact  Ears: Normal TM's and external ear canals, both ears  Nose: Nares normal, septum midline,mucosa normal, no drainage  Throat: Lips, mucosa, and tongue normal; teeth and gums normal; oropharynx is clear  Neck: Supple,without lymphadenopathy, no thyromegaly or nodules noted  Lungs: Clear to auscultation bilaterally, respirations unlabored, no wheezing or crackles  Heart: Regular rate and rhythm, no murmur   Abdomen: Soft, non-tender, no masses, no organomegaly  Extremities: Extremities with strong and symmetric pulses, no cyanosis or edema  Skin: Skin color, texture normal, no rashes or lesions  Neurologic: Grossly normal, no focal deficits    "

## 2024-12-03 SDOH — HEALTH STABILITY: PHYSICAL HEALTH: ON AVERAGE, HOW MANY MINUTES DO YOU ENGAGE IN EXERCISE AT THIS LEVEL?: 40 MIN

## 2024-12-03 SDOH — HEALTH STABILITY: PHYSICAL HEALTH: ON AVERAGE, HOW MANY DAYS PER WEEK DO YOU ENGAGE IN MODERATE TO STRENUOUS EXERCISE (LIKE A BRISK WALK)?: 1 DAY

## 2024-12-03 ASSESSMENT — SOCIAL DETERMINANTS OF HEALTH (SDOH): HOW OFTEN DO YOU GET TOGETHER WITH FRIENDS OR RELATIVES?: ONCE A WEEK

## 2024-12-04 ENCOUNTER — OFFICE VISIT (OUTPATIENT)
Dept: FAMILY MEDICINE | Facility: CLINIC | Age: 64
End: 2024-12-04
Payer: COMMERCIAL

## 2024-12-04 VITALS
HEIGHT: 66 IN | WEIGHT: 180.9 LBS | BODY MASS INDEX: 29.07 KG/M2 | RESPIRATION RATE: 18 BRPM | SYSTOLIC BLOOD PRESSURE: 120 MMHG | TEMPERATURE: 97.6 F | OXYGEN SATURATION: 98 % | HEART RATE: 90 BPM | DIASTOLIC BLOOD PRESSURE: 70 MMHG

## 2024-12-04 DIAGNOSIS — E78.5 HYPERLIPIDEMIA LDL GOAL <130: ICD-10-CM

## 2024-12-04 DIAGNOSIS — Z00.00 ROUTINE ADULT HEALTH MAINTENANCE: Primary | ICD-10-CM

## 2024-12-04 DIAGNOSIS — Z13.1 DIABETES MELLITUS SCREENING: ICD-10-CM

## 2024-12-04 DIAGNOSIS — N18.31 STAGE 3A CHRONIC KIDNEY DISEASE (H): ICD-10-CM

## 2024-12-04 DIAGNOSIS — Z86.79 HX OF ESSENTIAL HYPERTENSION: ICD-10-CM

## 2024-12-04 DIAGNOSIS — Z12.31 SCREENING MAMMOGRAM FOR BREAST CANCER: ICD-10-CM

## 2024-12-04 LAB
ERYTHROCYTE [DISTWIDTH] IN BLOOD BY AUTOMATED COUNT: 11.9 % (ref 10–15)
EST. AVERAGE GLUCOSE BLD GHB EST-MCNC: 123 MG/DL
HBA1C MFR BLD: 5.9 % (ref 0–5.6)
HCT VFR BLD AUTO: 41.9 % (ref 35–47)
HGB BLD-MCNC: 14.3 G/DL (ref 11.7–15.7)
MCH RBC QN AUTO: 28.7 PG (ref 26.5–33)
MCHC RBC AUTO-ENTMCNC: 34.1 G/DL (ref 31.5–36.5)
MCV RBC AUTO: 84 FL (ref 78–100)
PLATELET # BLD AUTO: 283 10E3/UL (ref 150–450)
RBC # BLD AUTO: 4.99 10E6/UL (ref 3.8–5.2)
WBC # BLD AUTO: 6 10E3/UL (ref 4–11)

## 2024-12-04 PROCEDURE — 85027 COMPLETE CBC AUTOMATED: CPT | Performed by: FAMILY MEDICINE

## 2024-12-04 PROCEDURE — 83036 HEMOGLOBIN GLYCOSYLATED A1C: CPT | Performed by: FAMILY MEDICINE

## 2024-12-04 PROCEDURE — 80061 LIPID PANEL: CPT | Performed by: FAMILY MEDICINE

## 2024-12-04 PROCEDURE — 82043 UR ALBUMIN QUANTITATIVE: CPT | Performed by: FAMILY MEDICINE

## 2024-12-04 PROCEDURE — 99396 PREV VISIT EST AGE 40-64: CPT | Performed by: FAMILY MEDICINE

## 2024-12-04 PROCEDURE — 82306 VITAMIN D 25 HYDROXY: CPT | Performed by: FAMILY MEDICINE

## 2024-12-04 PROCEDURE — 36415 COLL VENOUS BLD VENIPUNCTURE: CPT | Performed by: FAMILY MEDICINE

## 2024-12-04 PROCEDURE — 80048 BASIC METABOLIC PNL TOTAL CA: CPT | Performed by: FAMILY MEDICINE

## 2024-12-04 PROCEDURE — 82570 ASSAY OF URINE CREATININE: CPT | Performed by: FAMILY MEDICINE

## 2024-12-04 ASSESSMENT — PAIN SCALES - GENERAL: PAINLEVEL_OUTOF10: NO PAIN (0)

## 2024-12-04 NOTE — PATIENT INSTRUCTIONS
For the skin:  -start moisturizing - once a day before bed - non perfume, cerave/eucerin/aquaphor  -if not improving would consider clotrimazole antifungal cream twice per day        Shingles vaccine - check with insurance about getting at clinic vs pharmacy

## 2024-12-05 ENCOUNTER — PATIENT OUTREACH (OUTPATIENT)
Dept: CARE COORDINATION | Facility: CLINIC | Age: 64
End: 2024-12-05
Payer: COMMERCIAL

## 2024-12-05 LAB
ANION GAP SERPL CALCULATED.3IONS-SCNC: 13 MMOL/L (ref 7–15)
BUN SERPL-MCNC: 19.6 MG/DL (ref 8–23)
CALCIUM SERPL-MCNC: 9.9 MG/DL (ref 8.8–10.4)
CHLORIDE SERPL-SCNC: 104 MMOL/L (ref 98–107)
CHOLEST SERPL-MCNC: 312 MG/DL
CREAT SERPL-MCNC: 0.98 MG/DL (ref 0.51–0.95)
CREAT UR-MCNC: 39.6 MG/DL
EGFRCR SERPLBLD CKD-EPI 2021: 65 ML/MIN/1.73M2
FASTING STATUS PATIENT QL REPORTED: ABNORMAL
FASTING STATUS PATIENT QL REPORTED: ABNORMAL
GLUCOSE SERPL-MCNC: 99 MG/DL (ref 70–99)
HCO3 SERPL-SCNC: 20 MMOL/L (ref 22–29)
HDLC SERPL-MCNC: 41 MG/DL
LDLC SERPL CALC-MCNC: 229 MG/DL
MICROALBUMIN UR-MCNC: <12 MG/L
MICROALBUMIN/CREAT UR: NORMAL MG/G{CREAT}
NONHDLC SERPL-MCNC: 271 MG/DL
POTASSIUM SERPL-SCNC: 4.3 MMOL/L (ref 3.4–5.3)
SODIUM SERPL-SCNC: 137 MMOL/L (ref 135–145)
TRIGL SERPL-MCNC: 210 MG/DL
VIT D+METAB SERPL-MCNC: 31 NG/ML (ref 20–50)

## 2024-12-09 ENCOUNTER — LAB (OUTPATIENT)
Dept: LAB | Facility: CLINIC | Age: 64
End: 2024-12-09
Payer: COMMERCIAL

## 2024-12-09 DIAGNOSIS — E78.5 HYPERLIPIDEMIA LDL GOAL <130: ICD-10-CM

## 2024-12-09 LAB
CHOLEST SERPL-MCNC: 284 MG/DL
FASTING STATUS PATIENT QL REPORTED: YES
HDLC SERPL-MCNC: 47 MG/DL
LDLC SERPL CALC-MCNC: 209 MG/DL
NONHDLC SERPL-MCNC: 237 MG/DL
TRIGL SERPL-MCNC: 138 MG/DL

## 2024-12-09 PROCEDURE — 80061 LIPID PANEL: CPT

## 2024-12-09 PROCEDURE — 36415 COLL VENOUS BLD VENIPUNCTURE: CPT

## 2025-06-02 ENCOUNTER — MYC REFILL (OUTPATIENT)
Dept: FAMILY MEDICINE | Facility: CLINIC | Age: 65
End: 2025-06-02
Payer: COMMERCIAL

## 2025-06-02 DIAGNOSIS — L30.9 DERMATITIS: ICD-10-CM

## 2025-06-02 RX ORDER — CLOTRIMAZOLE 1 %
CREAM (GRAM) TOPICAL 2 TIMES DAILY
Qty: 45 G | Refills: 1 | Status: SHIPPED | OUTPATIENT
Start: 2025-06-02

## 2025-06-09 ENCOUNTER — LAB (OUTPATIENT)
Dept: LAB | Facility: CLINIC | Age: 65
End: 2025-06-09
Payer: COMMERCIAL

## 2025-06-09 DIAGNOSIS — N18.30 CKD (CHRONIC KIDNEY DISEASE) STAGE 3, GFR 30-59 ML/MIN (H): Primary | ICD-10-CM

## 2025-06-09 DIAGNOSIS — E78.5 HYPERLIPIDEMIA LDL GOAL <130: ICD-10-CM

## 2025-06-09 LAB
ANION GAP SERPL CALCULATED.3IONS-SCNC: 9 MMOL/L (ref 7–15)
BUN SERPL-MCNC: 12.7 MG/DL (ref 8–23)
CALCIUM SERPL-MCNC: 9.1 MG/DL (ref 8.8–10.4)
CHLORIDE SERPL-SCNC: 107 MMOL/L (ref 98–107)
CHOLEST SERPL-MCNC: 248 MG/DL
CREAT SERPL-MCNC: 0.91 MG/DL (ref 0.51–0.95)
EGFRCR SERPLBLD CKD-EPI 2021: 71 ML/MIN/1.73M2
FASTING STATUS PATIENT QL REPORTED: ABNORMAL
FASTING STATUS PATIENT QL REPORTED: NORMAL
GLUCOSE SERPL-MCNC: 90 MG/DL (ref 70–99)
HCO3 SERPL-SCNC: 26 MMOL/L (ref 22–29)
HDLC SERPL-MCNC: 45 MG/DL
LDLC SERPL CALC-MCNC: 177 MG/DL
NONHDLC SERPL-MCNC: 203 MG/DL
POTASSIUM SERPL-SCNC: 4.2 MMOL/L (ref 3.4–5.3)
SODIUM SERPL-SCNC: 142 MMOL/L (ref 135–145)
TRIGL SERPL-MCNC: 132 MG/DL

## 2025-06-09 PROCEDURE — 80061 LIPID PANEL: CPT

## 2025-06-09 PROCEDURE — 36415 COLL VENOUS BLD VENIPUNCTURE: CPT

## 2025-06-09 PROCEDURE — 80048 BASIC METABOLIC PNL TOTAL CA: CPT

## 2025-06-11 ENCOUNTER — RESULTS FOLLOW-UP (OUTPATIENT)
Dept: FAMILY MEDICINE | Facility: CLINIC | Age: 65
End: 2025-06-11

## 2025-07-21 ENCOUNTER — MYC MEDICAL ADVICE (OUTPATIENT)
Dept: FAMILY MEDICINE | Facility: CLINIC | Age: 65
End: 2025-07-21
Payer: COMMERCIAL

## 2025-07-21 DIAGNOSIS — E78.5 HYPERLIPIDEMIA LDL GOAL <130: ICD-10-CM

## 2025-07-21 RX ORDER — SIMVASTATIN 80 MG
80 TABLET ORAL AT BEDTIME
Qty: 90 TABLET | Refills: 3 | Status: SHIPPED | OUTPATIENT
Start: 2025-07-21

## (undated) DEVICE — KIT ENDO TURNOVER/PROCEDURE W/CLEAN A SCOPE LINERS 103888

## (undated) RX ORDER — FENTANYL CITRATE 50 UG/ML
INJECTION, SOLUTION INTRAMUSCULAR; INTRAVENOUS
Status: DISPENSED
Start: 2024-07-02

## (undated) RX ORDER — ONDANSETRON 2 MG/ML
INJECTION INTRAMUSCULAR; INTRAVENOUS
Status: DISPENSED
Start: 2024-07-02